# Patient Record
Sex: MALE | Race: WHITE | Employment: OTHER | URBAN - METROPOLITAN AREA
[De-identification: names, ages, dates, MRNs, and addresses within clinical notes are randomized per-mention and may not be internally consistent; named-entity substitution may affect disease eponyms.]

---

## 2018-08-23 ENCOUNTER — HOSPITAL ENCOUNTER (INPATIENT)
Age: 33
LOS: 4 days | Discharge: HOME | DRG: 897 | End: 2018-08-27
Attending: EMERGENCY MEDICINE | Admitting: PSYCHIATRY & NEUROLOGY
Payer: SELF-PAY

## 2018-08-23 DIAGNOSIS — F19.11 H/O: SUBSTANCE ABUSE (HCC): ICD-10-CM

## 2018-08-23 DIAGNOSIS — E87.6 HYPOKALEMIA: ICD-10-CM

## 2018-08-23 DIAGNOSIS — R45.851 SUICIDAL IDEATION: Primary | ICD-10-CM

## 2018-08-23 DIAGNOSIS — Z59.00 HOMELESSNESS: ICD-10-CM

## 2018-08-23 PROBLEM — F31.9 BIPOLAR DISORDER (HCC): Status: ACTIVE | Noted: 2018-08-23

## 2018-08-23 LAB
ALBUMIN SERPL-MCNC: 3.4 G/DL (ref 3.5–5)
ALBUMIN/GLOB SERPL: 1.1 {RATIO} (ref 1.1–2.2)
ALP SERPL-CCNC: 46 U/L (ref 45–117)
ALT SERPL-CCNC: 26 U/L (ref 12–78)
AMPHET UR QL SCN: NEGATIVE
ANION GAP SERPL CALC-SCNC: 8 MMOL/L (ref 5–15)
APPEARANCE UR: CLEAR
AST SERPL-CCNC: 20 U/L (ref 15–37)
BARBITURATES UR QL SCN: NEGATIVE
BASOPHILS # BLD: 0 K/UL (ref 0–0.1)
BASOPHILS NFR BLD: 1 % (ref 0–1)
BENZODIAZ UR QL: NEGATIVE
BILIRUB SERPL-MCNC: 0.2 MG/DL (ref 0.2–1)
BILIRUB UR QL: NEGATIVE
BUN SERPL-MCNC: 10 MG/DL (ref 6–20)
BUN/CREAT SERPL: 11 (ref 12–20)
CALCIUM SERPL-MCNC: 8.5 MG/DL (ref 8.5–10.1)
CANNABINOIDS UR QL SCN: NEGATIVE
CHLORIDE SERPL-SCNC: 107 MMOL/L (ref 97–108)
CO2 SERPL-SCNC: 30 MMOL/L (ref 21–32)
COCAINE UR QL SCN: NEGATIVE
COLOR UR: NORMAL
CREAT SERPL-MCNC: 0.92 MG/DL (ref 0.7–1.3)
DIFFERENTIAL METHOD BLD: ABNORMAL
DRUG SCRN COMMENT,DRGCM: NORMAL
EOSINOPHIL # BLD: 0.2 K/UL (ref 0–0.4)
EOSINOPHIL NFR BLD: 3 % (ref 0–7)
ERYTHROCYTE [DISTWIDTH] IN BLOOD BY AUTOMATED COUNT: 12.6 % (ref 11.5–14.5)
ETHANOL SERPL-MCNC: <10 MG/DL
GLOBULIN SER CALC-MCNC: 3.1 G/DL (ref 2–4)
GLUCOSE SERPL-MCNC: 97 MG/DL (ref 65–100)
GLUCOSE UR STRIP.AUTO-MCNC: NEGATIVE MG/DL
HCT VFR BLD AUTO: 35.3 % (ref 36.6–50.3)
HGB BLD-MCNC: 12.3 G/DL (ref 12.1–17)
HGB UR QL STRIP: NEGATIVE
IMM GRANULOCYTES # BLD: 0 K/UL (ref 0–0.04)
IMM GRANULOCYTES NFR BLD AUTO: 0 % (ref 0–0.5)
KETONES UR QL STRIP.AUTO: NEGATIVE MG/DL
LEUKOCYTE ESTERASE UR QL STRIP.AUTO: NEGATIVE
LYMPHOCYTES # BLD: 1.8 K/UL (ref 0.8–3.5)
LYMPHOCYTES NFR BLD: 29 % (ref 12–49)
MCH RBC QN AUTO: 32 PG (ref 26–34)
MCHC RBC AUTO-ENTMCNC: 34.8 G/DL (ref 30–36.5)
MCV RBC AUTO: 91.9 FL (ref 80–99)
METHADONE UR QL: NEGATIVE
MONOCYTES # BLD: 0.7 K/UL (ref 0–1)
MONOCYTES NFR BLD: 11 % (ref 5–13)
NEUTS SEG # BLD: 3.6 K/UL (ref 1.8–8)
NEUTS SEG NFR BLD: 57 % (ref 32–75)
NITRITE UR QL STRIP.AUTO: NEGATIVE
NRBC # BLD: 0 K/UL (ref 0–0.01)
NRBC BLD-RTO: 0 PER 100 WBC
OPIATES UR QL: NEGATIVE
PCP UR QL: NEGATIVE
PH UR STRIP: 6 [PH] (ref 5–8)
PLATELET # BLD AUTO: 127 K/UL (ref 150–400)
PMV BLD AUTO: 9.8 FL (ref 8.9–12.9)
POTASSIUM SERPL-SCNC: 3.1 MMOL/L (ref 3.5–5.1)
PROT SERPL-MCNC: 6.5 G/DL (ref 6.4–8.2)
PROT UR STRIP-MCNC: NEGATIVE MG/DL
RBC # BLD AUTO: 3.84 M/UL (ref 4.1–5.7)
SODIUM SERPL-SCNC: 145 MMOL/L (ref 136–145)
SP GR UR REFRACTOMETRY: 1.01 (ref 1–1.03)
UROBILINOGEN UR QL STRIP.AUTO: 0.2 EU/DL (ref 0.2–1)
WBC # BLD AUTO: 6.3 K/UL (ref 4.1–11.1)

## 2018-08-23 PROCEDURE — 80053 COMPREHEN METABOLIC PANEL: CPT | Performed by: EMERGENCY MEDICINE

## 2018-08-23 PROCEDURE — 90791 PSYCH DIAGNOSTIC EVALUATION: CPT

## 2018-08-23 PROCEDURE — 74011250637 HC RX REV CODE- 250/637: Performed by: EMERGENCY MEDICINE

## 2018-08-23 PROCEDURE — 36415 COLL VENOUS BLD VENIPUNCTURE: CPT | Performed by: EMERGENCY MEDICINE

## 2018-08-23 PROCEDURE — 65220000003 HC RM SEMIPRIVATE PSYCH

## 2018-08-23 PROCEDURE — 80307 DRUG TEST PRSMV CHEM ANLYZR: CPT | Performed by: EMERGENCY MEDICINE

## 2018-08-23 PROCEDURE — 85025 COMPLETE CBC W/AUTO DIFF WBC: CPT | Performed by: EMERGENCY MEDICINE

## 2018-08-23 PROCEDURE — 93005 ELECTROCARDIOGRAM TRACING: CPT

## 2018-08-23 PROCEDURE — 81003 URINALYSIS AUTO W/O SCOPE: CPT | Performed by: EMERGENCY MEDICINE

## 2018-08-23 PROCEDURE — 99284 EMERGENCY DEPT VISIT MOD MDM: CPT

## 2018-08-23 RX ORDER — HYDROXYZINE PAMOATE 25 MG/1
50 CAPSULE ORAL
Status: COMPLETED | OUTPATIENT
Start: 2018-08-23 | End: 2018-08-23

## 2018-08-23 RX ORDER — ADHESIVE BANDAGE
30 BANDAGE TOPICAL DAILY PRN
Status: DISCONTINUED | OUTPATIENT
Start: 2018-08-23 | End: 2018-08-27 | Stop reason: HOSPADM

## 2018-08-23 RX ORDER — ZOLPIDEM TARTRATE 10 MG/1
10 TABLET ORAL
Status: DISCONTINUED | OUTPATIENT
Start: 2018-08-23 | End: 2018-08-27 | Stop reason: HOSPADM

## 2018-08-23 RX ORDER — BENZTROPINE MESYLATE 1 MG/ML
2 INJECTION INTRAMUSCULAR; INTRAVENOUS
Status: DISCONTINUED | OUTPATIENT
Start: 2018-08-23 | End: 2018-08-27 | Stop reason: HOSPADM

## 2018-08-23 RX ORDER — OLANZAPINE 5 MG/1
5 TABLET ORAL
Status: DISCONTINUED | OUTPATIENT
Start: 2018-08-23 | End: 2018-08-27 | Stop reason: HOSPADM

## 2018-08-23 RX ORDER — IBUPROFEN 200 MG
1 TABLET ORAL
Status: DISCONTINUED | OUTPATIENT
Start: 2018-08-23 | End: 2018-08-27 | Stop reason: HOSPADM

## 2018-08-23 RX ORDER — LORAZEPAM 2 MG/ML
2 INJECTION INTRAMUSCULAR
Status: DISCONTINUED | OUTPATIENT
Start: 2018-08-23 | End: 2018-08-27 | Stop reason: HOSPADM

## 2018-08-23 RX ORDER — BENZTROPINE MESYLATE 2 MG/1
2 TABLET ORAL
Status: DISCONTINUED | OUTPATIENT
Start: 2018-08-23 | End: 2018-08-27 | Stop reason: HOSPADM

## 2018-08-23 RX ORDER — IBUPROFEN 400 MG/1
400 TABLET ORAL
Status: DISCONTINUED | OUTPATIENT
Start: 2018-08-23 | End: 2018-08-27 | Stop reason: HOSPADM

## 2018-08-23 RX ORDER — POTASSIUM CHLORIDE 1.5 G/1.77G
40 POWDER, FOR SOLUTION ORAL
Status: COMPLETED | OUTPATIENT
Start: 2018-08-23 | End: 2018-08-23

## 2018-08-23 RX ORDER — LORAZEPAM 1 MG/1
1 TABLET ORAL
Status: DISCONTINUED | OUTPATIENT
Start: 2018-08-23 | End: 2018-08-24

## 2018-08-23 RX ORDER — ACETAMINOPHEN 325 MG/1
650 TABLET ORAL
Status: DISCONTINUED | OUTPATIENT
Start: 2018-08-23 | End: 2018-08-27 | Stop reason: HOSPADM

## 2018-08-23 RX ADMIN — POTASSIUM CHLORIDE 40 MEQ: 1.5 POWDER, FOR SOLUTION ORAL at 15:12

## 2018-08-23 RX ADMIN — HYDROXYZINE PAMOATE 50 MG: 25 CAPSULE ORAL at 13:51

## 2018-08-23 SDOH — ECONOMIC STABILITY - HOUSING INSECURITY: HOMELESSNESS UNSPECIFIED: Z59.00

## 2018-08-23 NOTE — BSMART NOTE
Comprehensive Assessment Form Part 1    Section I - Disposition    Axis I - Bipolar Disorder (per patient)   Axis II - Deferred  Axis III - Seizures, VSD  Axis IV - Homelessness, Legal stressors, Lack of structure  [place] V - 35      The Medical Doctor to Psychiatrist conference was not completed. The Medical Doctor is in agreement with Psychiatrist disposition because of (reason) patient is requesting voluntary admission. The plan is admit to [facility]. The on-call Psychiatrist consulted was Dr. Juany Dooley. The admitting Psychiatrist will be Dr. Juany Dooley. The admitting Diagnosis is Bipolar Disorder. The Payor source is self. Section II - Integrated Summary  Summary:  Patient is a 29year old female seen face to face in the ER. He was sent to the ER by [facility] after he was seen by their rapid access today and requested the CSU, which is not currently open. He reported suicidal ideation and that he lived in [place], so he was not opened by [facility], he was sent to the ER for possible admission. He was released from senior care 2 days ago after spending 42 days incarcerated for trespassing. While he was in senior care his medications were resumed but also changed. He reports he is now on Remeron and Zyprexa. He has 5 previous inpatient hospitalizations at [facility], most recently in July 2018 prior to being incarcerated. He told the ER physician here that he wants \"a program to stay clean\" and that he also needs \"to get my meds straight. \"  He has an extensive history of abusing pretty much any substance he can get his hands on. He reported suicidal ideation with a plan to hang or cut himself. He denied homicidal ideation. He reported chronic auditory hallucinations. He is requesting voluntary admission. The patient has demonstrated mental capacity to provide informed consent. The information is given by the patient, Alycia Grace, and mother. The Chief Complaint is mental health problem.   The Precipitant Factors are recent discharge from alf, homelessness, chronic substance abuse issues. Previous Hospitalizations: yes  The patient has not previously been in restraints. Current Psychiatrist and/or  is NA. Lethality Assessment:    The potential for suicide is noted by the following: active psychosis, previous history of attempts, and ideation . The potential for homicide is not noted. The patient has not been a perpetrator of sexual or physical abuse. There are not pending charges. The patient is felt to be at risk for self harm or harm to others. The attending nurse was advised the patient needs supervision. Section III - Psychosocial  The patient's overall mood and attitude is depressed. Feelings of helplessness and hopelessness are observed by patient's self report. Generalized anxiety is not observed. Panic is not observed. Phobias are not observed. Obsessive compulsive tendencies are not observed. Section IV - Mental Status Exam  The patient's appearance shows no evidence of impairment. The patient's behavior shows no evidence of impairment. The patient is oriented to time, place, person and situation. The patient's speech shows no evidence of impairment. The patient's mood is depressed. The range of affect is constricted. The patient's thought content demonstrates no evidence of impairment. The thought process shows no evidence of impairment. The patient's perception demonstrated changes in the following:  auditory hallucinations. The patient's memory shows no evidence of impairment. The patient's appetite shows no evidence of impairment. The patient's sleep has evidence of insomnia. The patient's insight is blaming. The patient's judgement is psychologically impaired. Section V - Substance Abuse  The patient is not using substances right now, but patient has a history of using most any substance available in every way available.   He reported his drug of choice is heroin. The patient has experienced the following withdrawal symptoms: body aches and cravings. Section VI - Living Arrangements  The patient is single. The patient is homeless. The patient has one child age unknown. The patient does not plan to return home upon discharge. The patient does not have legal issues pending. The patient's source of income comes from family. Church and cultural practices have not been voiced at this time. The patient's greatest support comes from his mother and this person will not be involved with the treatment. The patient has not been in an event described as horrible or outside the realm of ordinary life experience either currently or in the past.  The patient has not been a victim of sexual/physical abuse. Section VII - Other Areas of Clinical Concern  The highest grade achieved is unknown with the overall quality of school experience being described as unknown. The patient is currently unemployed and speaks [place] as a primary language. The patient has no communication impairments affecting communication. The patient's preference for learning can be described as: can read and write adequately.   The patient's hearing is normal.  The patient's vision is normal.      [facility]

## 2018-08-23 NOTE — ED NOTES
Pt reports wanting to get into a program to \"stay clean\" and \"get meds straight\"; pt reports depression and some suicidal ideations; denies feeling suicidal currently; pt reports doing cocaine, opiates/heroin, amphetamines, and benzos until locked up about 43 days ago, reports being discharged 2 days ago; denies plan for harming self      Emergency Department Nursing Plan of Care       The Nursing Plan of Care is developed from the Nursing assessment and Emergency Department Attending provider initial evaluation. The plan of care may be reviewed in the ED Provider note.     The Plan of Care was developed with the following considerations:   Patient / Family readiness to learn indicated by:verbalized understanding  Persons(s) to be included in education: patient  Barriers to Learning/Limitations:No    Signed     Melissa Austin RN    8/23/2018   1:04 PM

## 2018-08-23 NOTE — ED NOTES
[address],Third Floor ability to accept patient report. Patient awake and ambulated to rest room.   Remains calm and cooperative

## 2018-08-23 NOTE — ED PROVIDER NOTES
EMERGENCY DEPARTMENT HISTORY AND PHYSICAL EXAM      Date: 8/23/2018  Patient Name: Liza Colón    History of Presenting Illness     Chief Complaint   Patient presents with   [address] Problem       History Provided By: Patient    HPI: Liza Colón, 29 y.o. male with PMHx significant for bipolar disorder and schizophrenia, presents ambulatory to the ED for mental health evaluation, including acute on chronic auditory hallucinations and SI without plan. Pt states he has been admitted for psychiatric reasons in the past, noting he was discharged from [facility] on 7/8. He explains he was arrested on 7/10 for trespassing and in [facility] until 8/21. Pt reports polysubstance abuse, including methamphetamines, cocaine, marijuana, and opiates, however explains the 42 days that he was in [facility] has been the longest he has remained off drugs since he started using. He reports since being released he has smoked one bowl of marijuana, however has not used any other illicit drugs or EtOH. Pt states he was previously managed by [facility] for substance abuse, which he did well with, however he went there this morning for intake and learned that they no longer have a substance abuse program. He conveys that [facility] referred him to the ED and he is requesting an inpatient program. He states at times he feels as though he is a threat to society as he is reckless when he is angry, but denies HI. Pt notes he is currently homeless. Chief complaint: mental health evaluation, request for inpatient rehab  Associated sx's: auditory hallucinations and SI without plan. Pt denies HI. Timing: acute on chronic  Context: prior psychiatric admissions, recent arrest, and illicit drug use  Duration: released from [facility] 8/21, sx's worsened today    There are no other complaints, changes, or physical findings at this time.     PCP: None    Current Outpatient Prescriptions   Medication Sig Dispense Refill    hydroxyzine pamoate (VISTARIL PO) Take  by mouth.  olanzapine (ZYPREXA PO) Take  by mouth.  mirtazapine (REMERON PO) Take  by mouth. Past History     Past Medical History:  Past Medical History:   Diagnosis Date    Heart abnormality     Psychiatric disorder     schizophrenia, bipolar    Seizures ([address]     VSD (ventricular septal defect)        Past Surgical History:  Past Surgical History:   Procedure Laterality Date    HX OTHER SURGICAL      PICC line placement       Family History:  History reviewed. No pertinent family history. Social History:  Social History   Substance Use Topics    Smoking status: Current Every Day Smoker    Smokeless tobacco: None    Alcohol use 14.0 oz/week     18 Cans of beer, 10 Shots of liquor per week      Comment: every day       Allergies:  No Known Allergies      Review of Systems   Review of Systems   Constitutional: Negative for chills and fever. HENT: Negative for congestion, rhinorrhea, sneezing and sore throat. Eyes: Negative for redness and visual disturbance. Respiratory: Negative for shortness of breath. Cardiovascular: Negative for chest pain and leg swelling. Gastrointestinal: Negative for abdominal pain, nausea and vomiting. Genitourinary: Negative for difficulty urinating and frequency. Musculoskeletal: Negative for back pain, myalgias and neck stiffness. Skin: Negative for rash. Neurological: Negative for dizziness, syncope, weakness and headaches. Hematological: Negative for adenopathy. Psychiatric/Behavioral: Positive for hallucinations and suicidal ideas. - homicidal plans   All other systems reviewed and are negative. Physical Exam   Physical Exam   Constitutional: He is oriented to person, place, and time. He appears well-developed and well-nourished. HENT:   Head: Normocephalic and atraumatic.    Mouth/Throat: Oropharynx is clear and moist and mucous membranes are normal.   Eyes: EOM are normal.   Neck: Normal range of motion and full passive range of motion without pain. Neck supple. Cardiovascular: Normal rate, regular rhythm, normal heart sounds, intact distal pulses and normal pulses. No murmur heard. Pulmonary/Chest: Effort normal and breath sounds normal. No respiratory distress. He exhibits no tenderness. Abdominal: Soft. Normal appearance and bowel sounds are normal. There is no tenderness. There is no rebound and no guarding. Neurological: He is alert and oriented to person, place, and time. He has normal strength. Skin: Skin is warm, dry and intact. No rash noted. No erythema. Psychiatric: His speech is normal. Judgment normal. He expresses suicidal ideation. He expresses no suicidal plans. Nursing note and vitals reviewed. Diagnostic Study Results     Labs -     Recent Results (from the past 12 hour(s))   EKG, 12 LEAD, INITIAL    Collection Time: 08/23/18  1:31 PM   Result Value Ref Range    Ventricular Rate 67 BPM    Atrial Rate 67 BPM    P-R Interval 120 ms    QRS Duration 118 ms    Q-T Interval 440 ms    QTC Calculation (Bezet) 464 ms    Calculated P Axis 57 degrees    Calculated R Axis 71 degrees    Calculated T Axis 54 degrees    Diagnosis       Normal sinus rhythm  Incomplete right bundle branch block  Borderline ECG  No previous ECGs available     METABOLIC PANEL, COMPREHENSIVE    Collection Time: 08/23/18  1:40 PM   Result Value Ref Range    Sodium 145 136 - 145 mmol/L    Potassium 3.1 (L) 3.5 - 5.1 mmol/L    Chloride 107 97 - 108 mmol/L    CO2 30 21 - 32 mmol/L    Anion gap 8 5 - 15 mmol/L    Glucose 97 65 - 100 mg/dL    BUN 10 6 - 20 MG/DL    Creatinine 0.92 0.70 - 1.30 MG/DL    BUN/Creatinine ratio 11 (L) 12 - 20      GFR est AA >60 >60 ml/min/1.73m2    GFR est non-AA >60 >60 ml/min/1.73m2    Calcium 8.5 8.5 - 10.1 MG/DL    Bilirubin, total 0.2 0.2 - 1.0 MG/DL    ALT (SGPT) 26 12 - 78 U/L    AST (SGOT) 20 15 - 37 U/L    Alk.  phosphatase 46 45 - 117 U/L    Protein, total 6.5 6.4 - 8.2 g/dL    Albumin 3.4 (L) 3.5 - 5.0 g/dL    Globulin 3.1 2.0 - 4.0 g/dL    A-G Ratio 1.1 1.1 - 2.2     ETHYL ALCOHOL    Collection Time: 08/23/18  1:40 PM   Result Value Ref Range    ALCOHOL(ETHYL),SERUM <10 <10 MG/DL   CBC WITH AUTOMATED DIFF    Collection Time: 08/23/18  1:40 PM   Result Value Ref Range    WBC 6.3 4.1 - 11.1 K/uL    RBC 3.84 (L) 4.10 - 5.70 M/uL    HGB 12.3 12.1 - 17.0 g/dL    HCT 35.3 (L) 36.6 - 50.3 %    MCV 91.9 80.0 - 99.0 FL    MCH 32.0 26.0 - 34.0 PG    MCHC 34.8 30.0 - 36.5 g/dL    RDW 12.6 11.5 - 14.5 %    PLATELET 813 (L) 286 - 400 K/uL    MPV 9.8 8.9 - 12.9 FL    NRBC 0.0 0  WBC    ABSOLUTE NRBC 0.00 0.00 - 0.01 K/uL    NEUTROPHILS 57 32 - 75 %    LYMPHOCYTES 29 12 - 49 %    MONOCYTES 11 5 - 13 %    EOSINOPHILS 3 0 - 7 %    BASOPHILS 1 0 - 1 %    IMMATURE GRANULOCYTES 0 0.0 - 0.5 %    ABS. NEUTROPHILS 3.6 1.8 - 8.0 K/UL    ABS. LYMPHOCYTES 1.8 0.8 - 3.5 K/UL    ABS. MONOCYTES 0.7 0.0 - 1.0 K/UL    ABS. EOSINOPHILS 0.2 0.0 - 0.4 K/UL    ABS. BASOPHILS 0.0 0.0 - 0.1 K/UL    ABS. IMM. GRANS. 0.0 0.00 - 0.04 K/UL    DF AUTOMATED     DRUG SCREEN, URINE    Collection Time: 08/23/18  1:40 PM   Result Value Ref Range    AMPHETAMINES NEGATIVE  NEG      BARBITURATES NEGATIVE  NEG      BENZODIAZEPINES NEGATIVE  NEG      COCAINE NEGATIVE  NEG      METHADONE NEGATIVE  NEG      OPIATES NEGATIVE  NEG      PCP(PHENCYCLIDINE) NEGATIVE  NEG      THC (TH-CANNABINOL) NEGATIVE  NEG      Drug screen comment (NOTE)      Medical Decision Making   I am the first provider for this patient. I reviewed the vital signs, available nursing notes, past medical history, past surgical history, family history and social history. Vital Signs-Reviewed the patient's vital signs.   Patient Vitals for the past 12 hrs:   Temp Pulse Resp BP SpO2   08/23/18 1254 97.4 °F (36.3 °C) 85 16 108/75 100 %       Pulse Oximetry Analysis - 100% on RA    Records Reviewed: Nursing Notes and Old Medical Records    Provider Notes (Medical Decision Making):   DDx: substance abuse, auditory hallucinations, schizophrenia, suicidal ideation. Pt reports he did not initially have SI, however since going to [facility] and getting frustrated he conveys vague suicidal ideas. ED Course:   Initial assessment performed. The patients presenting problems have been discussed, and they are in agreement with the care plan formulated and outlined with them. I have encouraged them to ask questions as they arise throughout their visit. ED EKG interpretation: 13:31  Rhythm: normal sinus rhythm and incomplete RBBB; and regular . Rate (approx.): 67; Axis: normal; P wave: normal; QRS interval: normal ; ST/T wave: normal; Other findings: borderline ekg. This EKG was interpreted by Natali Lowery MD,ED Provider. CONSULT NOTE:   1:18 PM  Natali Lowery MD spoke with Cedric Valero   Specialty: Jasmina Churchill  Discussed pt's hx, disposition, and available diagnostic and imaging results. Reviewed care plans. Consultant agrees with plans as outlined. Cedric Valero [place] a [place] at [address], however will then come to [facility] to evaluate pt. Written by AISSATOU Celeste, as dictated by Natali Lowery MD.    PROGRESS NOTE:  2:20 PM  Pt is medically cleared. Has been given a dose of potassium for his hypokalemia. Written by AISSATOU Celeste, as dictated by Natali Lowery MD.    PROGRESS NOTE:  4:11 PM  Cedric Valero is in the department. She will discuss pt with his mother first and then evaluate him. Written by AISSATOU Celeste, as dictated by Natali Lowery MD.    PROGRESS NOTE:  4:59 PM  Cedric Valero has evaluated pt. She states he will be admitted to psychiatry by Dr. Curtis Salomon. Psychiatry requesting UA.   Written by AISSATOU Celeste, as dictated by Natali Lowery MD.    Critical Care Time: 0    Disposition:  ADMIT NOTE:  5:03 PM  The patient is being admitted to the hospital by Dominic Rendon MD.  The results of their tests and reasons for their admission have been discussed with the patient and/or available family. They convey agreement and understanding for the need to be admitted and for their admission diagnosis. PLAN:  1. Admit to psychiatry     Diagnosis     Clinical Impression:   1. Suicidal ideation    2. H/O: substance abuse    3. Homelessness    4. Hypokalemia        Attestations: This note is prepared by Shea Apgar, acting as Scribe for Marcell Claude, MD.    Marcell Claude, MD: The scribe's documentation has been prepared under my direction and personally reviewed by me in its entirety. I confirm that the note above accurately reflects all work, treatment, procedures, and medical decision making performed by me. This note will not be viewable in [address]

## 2018-08-23 NOTE — ED NOTES
Pt reports being seen at [facility] today for intake, given follow up information

## 2018-08-23 NOTE — ED NOTES
Patient transported to [facility] Unit via wheel chair.   Accompanied by Security

## 2018-08-23 NOTE — IP AVS SNAPSHOT
Summary of Care Report The Summary of Care report has been created to help improve care coordination. Users with access to [employer] or [facility] (Web-based application) may access additional patient information including the Discharge Summary. If you are not currently a [address] user and need more information, please call the number listed below in the [place] [address] section and ask to be connected with Medical Records. Facility Information Name Address Phone [facility] [address] [place] [address] [phone] Patient Information Patient Name Sex KAUSHAL Gaitan ([id number]) Male 1985 Discharge Information Admitting Provider Service Area Unit Jaylen Tinsley MD / [address] [place] [address] / [phone] Discharge Provider Discharge Date/Time Discharge Disposition Destination (none) 2018 Midday (Pending) AHR (none) Patient Language Language ENGLISH [13] Hospital Problems as of 2018  Never Reviewed Class Noted - Resolved Last Modified POA Active Problems Adjustment disorder with depressed mood  2018 - Present 2018 by Jesús Fu MD Unknown Entered by Jesús Fu MD  
  * (Principal)Major depressive disorder, single episode, unspecified  2018 - Present 2018 by Jesús Fu MD Unknown Entered by Jesús Fu MD  
  Methamphetamine use disorder, moderate ([facility] [address]  2018 - Present 2018 by Jesús Fu MD Unknown Entered by Jesús Fu MD  
  
You are allergic to the following No active allergies Current Discharge Medication List  
  
CONTINUE these medications which have CHANGED Dose & Instructions Dispensing Information Comments  * REMERON PO  
 What changed:  Another medication with the same name was added. Make sure you understand how and when to take each. Take  by mouth. Refills:  0  
   
 * mirtazapine 7.5 mg tablet Commonly known as:  Danny Section What changed: You were already taking a medication with the same name, and this prescription was added. Make sure you understand how and when to take each. Dose:  22.5 mg Take 3 Tabs by mouth nightly. Indications: major depressive disorder Quantity:  90 Tab Refills:  0  
   
 * VISTARIL PO What changed:  Another medication with the same name was added. Make sure you understand how and when to take each. Take  by mouth. Refills:  0  
   
 * hydrOXYzine pamoate 50 mg capsule Commonly known as:  VISTARIL What changed: You were already taking a medication with the same name, and this prescription was added. Make sure you understand how and when to take each. Dose:  50 mg Take 1 Cap by mouth three (3) times daily as needed for Anxiety for up to 14 days. Indications: anxiety Quantity:  90 Cap Refills:  0  
   
 * Notice: This list has 4 medication(s) that are the same as other medications prescribed for you. Read the directions carefully, and ask your doctor or other care provider to review them with you. STOP taking these medications Comments ZYPREXA PO Follow-up Information Follow up With Details Comments Contact Info [address] Call today [facility] Int[facility] appointment for services. Due to the limited slots, you may want to arrive by 7:00am. The doors open at 7:30am and you should sign in at the registration desk. [address] [address] Hours: Monday-Friday 8:30am- 12:00PM & 1:00PM-4:30pm  
[phone] (phone) [phone]  (fax)  [facility] and susbtance abuse medication  management, therapy and case management serivces. [address] [address] [phone] [facility] Go today This is the inpatient rehabilitation facility for substance abuse you will be going to today. Stefania Albrecht [address] [place] [address] 
[phone] [address]    [id number]  [address] 
[phone] Housing Crisis Intake [facility] [phone] Discharge Instructions DISCHARGE SUMMARY from Nurse PATIENT INSTRUCTIONS: 
What to do at Home: 
Recommended activity: Activity as tolerated *  Please give a list of your current medications to your Primary Care Provider. *  Please update this list whenever your medications are discontinued, doses are 
    changed, or new medications (including over-the-counter products) are added. *  Please carry medication information at all times in case of emergency situations. These are general instructions for a healthy lifestyle: No smoking/ No tobacco products/ Avoid exposure to second hand smoke Surgeon General's Warning:  Quitting smoking now greatly reduces serious risk to your health. Obesity, smoking, and sedentary lifestyle greatly increases your risk for illness A healthy diet, regular physical exercise & weight monitoring are important for maintaining a healthy lifestyle The discharge information has been reviewed with the patient. The patient verbalized understanding. Discharge medications reviewed with the patient and appropriate educational materials and side effects teaching were provided. ___________________________________________________________________________________________________________________________________ [place] If I feel I am at risk of hurting myself or others, I will call the crisis office and speak with a crisis worker who will assist me during my crisis. Connie [facility] [employer]  [phone] [address] [address] [phone] [place] [address]  [phone] [employer] [facility]-   [phone] [employer]-  [phone] [address] [phone] [facility]  [phone] Chart Review Routing History No Routing History on File

## 2018-08-23 NOTE — IP AVS SNAPSHOT
[address] [facility] 
 
 
 [address] White Patient: Pete Tan MRN: [id number] About your hospitalization You were admitted on:  August 23, 2018 You last received care in the:  [facility] [address] You were discharged on:  August 27, 2018 Why you were hospitalized Your primary diagnosis was: Major Depressive Disorder, Single Episode, Unspecified Your diagnoses also included:  Bipolar Disorder (Hcc), Adjustment Disorder With Depressed Mood, Methamphetamine Use Disorder, Moderate (Hcc) Follow-up Information Follow up With Details Comments Contact Info Silverio[address] Kermit Cuellar Call today For outpatient susbstance services Intake appointment for services. Due to the limited slots, you may want to arrive by 7:00am. The doors open at 7:30am and you should sign in at the registration desk. [id number] Hours: Monday-Friday 8:30am- 12:00PM & 1:00PM-4:30pm  
[phone] (phone) [phone]  (fax) Pham Ching [facility] area [facility] for mental health and susbtance abuse medication  management, therapy and case management serivces. [address] [address] [phone] [facility] Go today This is the inpatient rehabilitation facility for substance abuse you will be going to today. Stefania Albrecht [address] [place], [facility] [address] [place] [address] 
[phone] Nieves Ludwig 1    [address]  H .1 ANALY/Ruddy Newell Final 
[phone] Housing Crisis Intake [employer] [phone] Discharge Orders None A check ofelia indicates which time of day the medication should be taken. My Medications CHANGE how you take these medications Instructions Each Dose to Equal  
 Morning Noon Evening Bedtime * REMERON PO What changed:  Another medication with the same name was added.  Make sure you understand how and when to take each. Your last dose was: Your next dose is: Take  by mouth. * mirtazapine 7.5 mg tablet Commonly known as:  Elsie Garcia What changed: You were already taking a medication with the same name, and this prescription was added. Make sure you understand how and when to take each. Your last dose was: Your next dose is: Take 3 Tabs by mouth nightly. Indications: major depressive disorder 22.5 mg  
    
   
   
   
  
 * VISTARIL PO What changed:  Another medication with the same name was added. Make sure you understand how and when to take each. Your last dose was: Your next dose is: Take  by mouth. * hydrOXYzine pamoate 50 mg capsule Commonly known as:  VISTARIL What changed: You were already taking a medication with the same name, and this prescription was added. Make sure you understand how and when to take each. Your last dose was: Your next dose is: Take 1 Cap by mouth three (3) times daily as needed for Anxiety for up to 14 days. Indications: anxiety 50 mg  
    
   
   
   
  
 * Notice: This list has 4 medication(s) that are the same as other medications prescribed for you. Read the directions carefully, and ask your doctor or other care provider to review them with you. STOP taking these medications ZYPREXA PO Where to Get Your Medications Information on where to get these meds will be given to you by the nurse or doctor. ! Ask your nurse or doctor about these medications  
  hydrOXYzine pamoate 50 mg capsule  
 mirtazapine 7.5 mg tablet Discharge Instructions DISCHARGE SUMMARY from Nurse PATIENT INSTRUCTIONS: 
What to do at Home: 
Recommended activity: Activity as tolerated *  Please give a list of your current medications to your Primary Care Provider. *  Please update this list whenever your medications are discontinued, doses are 
    changed, or new medications (including over-the-counter products) are added. *  Please carry medication information at all times in case of emergency situations. These are general instructions for a healthy lifestyle: No smoking/ No tobacco products/ Avoid exposure to second hand smoke Surgeon General's Warning:  Quitting smoking now greatly reduces serious risk to your health. Obesity, smoking, and sedentary lifestyle greatly increases your risk for illness A healthy diet, regular physical exercise & weight monitoring are important for maintaining a healthy lifestyle The discharge information has been reviewed with the patient. The patient verbalized understanding. Discharge medications reviewed with the patient and appropriate educational materials and side effects teaching were provided. ___________________________________________________________________________________________________________________________________ Jacquelin Escobar If I feel I am at risk of hurting myself or others, I will call the crisis office and speak with a crisis worker who will assist me during my crisis. Jacquelin Escobar [facility]  [phone] [address] [place] [address] [phone] [place] [address]  [phone] [employer] 44-   [phone] [employer]-  [phone] 9 [facility]  [phone] [address] [place] [address]  [phone] Introducing \[facility]\"" & HEALTH SERVICES! [address] introduces [employer] patient portal. Now you can access parts of your medical record, email your doctor's office, and request medication refills online. 1. In your internet browser, go to [email or web address] 2. Click on the First Time User? Click Here link in the Sign In box. You will see the New Member Sign Up page. 3. Enter your [employer] Access Code exactly as it appears below.  You will not need to use this code after youve completed the sign-up process. If you do not sign up before the expiration date, you must request a new code. · [employer] Access Code: [email or web address] Expires: 11/21/2018 12:51 PM 
 
4. Enter the last four digits of your Social Security Number (xxxx) and Date of Birth (mm/dd/yyyy) as indicated and click Submit. You will be taken to the next sign-up page. 5. Create a [employer] ID. This will be your [employer] login ID and cannot be changed, so think of one that is secure and easy to remember. 6. Create a [employer] password. You can change your password at any time. 7. Enter your Password Reset Question and Answer. This can be used at a later time if you forget your password. 8. Enter your e-mail address. You will receive e-mail notification when new information is available in [address] 9. Click Sign Up. You can now view and download portions of your medical record. 10. Click the Download Summary menu link to download a portable copy of your medical information. If you have questions, please visit the Frequently Asked Questions section of the [employer] website. Remember, [employer] is NOT to be used for urgent needs. For medical emergencies, dial 911. Now available from your iPhone and Android! Introducing Lenin Tran As a [facility] patient, I wanted to make you aware of our electronic visit tool called [employer]. [facility] 24/7 allows you to connect within minutes with a medical provider 24 hours a day, seven days a week via a mobile device or tablet or logging into a secure website from your computer. You can access Lenin Tran from anywhere in the [place].  
 
A virtual visit might be right for you when you have a simple condition and feel like you just dont want to get out of bed, or cant get away from work for an appointment, when your regular [facility] provider is not available (evenings, weekends or holidays), or when youre out of town and need minor care. Electronic visits cost only $49 and if the [employer] 24/7 provider determines a prescription is needed to treat your condition, one can be electronically transmitted to a nearby pharmacy*. Please take a moment to enroll today if you have not already done so. The enrollment process is free and takes just a few minutes. To enroll, please download the [employer] 24/7 jason to your tablet or phone, or visit [email or web address] to enroll on your computer. And, as an [address] [place] [address] patient with a [email or web address] account, the results of your visits will be scanned into your electronic medical record and your primary care provider will be able to view the scanned results. We urge you to continue to see your regular [employer] provider for your ongoing medical care. And while your primary care provider may not be the one available when you seek a [employer] virtual visit, the peace of mind you get from getting a real diagnosis real time can be priceless. For more information on [employer], view our Frequently Asked Questions (FAQs) at [email or web address]. Sincerely, 
 
Evy Strickland MD 
Chief Medical Officer [address] [place] [address] *:  certain medications cannot be prescribed via [facility] Providers Seen During Your Hospitalization Provider Specialty Primary office phone Gosia Page MD Emergency Medicine [phone] Gary Landers MD Psychiatry [phone] Jo Ann Comer MD Psychiatry [phone] Your Primary Care Physician (PCP) Primary Care Physician Office Phone Office Fax NONE ** None ** ** None ** You are allergic to the following No active allergies Recent Documentation Height Weight BMI Smoking Status 1.73 m 67.2 kg 22.46 kg/m2 Current Every Day Smoker Emergency Contacts Name Discharge Info Relation Home Work Mobile Chely Mclain DISCHARGE CAREGIVER [3] Parent [1] [phone] Patient Belongings The following personal items are in your possession at time of discharge: 
  Dental Appliances: (P) None  Visual Aid: None      Home Medications: (P) Sent to pharmacy   Jewelry: (P) None  Clothing: (P) Footwear, Shirt, Shorts    Other Valuables: (P) Keys, Lighter/matches, Money (comment)  Personal Items Sent to Safe: (P) money, lighters, keys Please provide this summary of care documentation to your next provider. Signatures-by signing, you are acknowledging that this After Visit Summary has been reviewed with you and you have received a copy. Patient Signature:  ____________________________________________________________ Date:  ____________________________________________________________  
  
Collette Roque Provider Signature:  ____________________________________________________________ Date:  ____________________________________________________________

## 2018-08-23 NOTE — IP AVS SNAPSHOT
[address] [facility] 
 
 
 [address] Patient: Delia Bella MRN: [id number] KFM:[id number] A check ofelia indicates which time of day the medication should be taken. My Medications CHANGE how you take these medications Instructions Each Dose to Equal  
 Morning Noon Evening Bedtime * REMERON PO What changed:  Another medication with the same name was added. Make sure you understand how and when to take each. Your last dose was: Your next dose is: Take  by mouth. * mirtazapine 7.5 mg tablet Commonly known as:  Nori Harding What changed: You were already taking a medication with the same name, and this prescription was added. Make sure you understand how and when to take each. Your last dose was: Your next dose is: Take 3 Tabs by mouth nightly. Indications: major depressive disorder 22.5 mg  
    
   
   
   
  
 * VISTARIL PO What changed:  Another medication with the same name was added. Make sure you understand how and when to take each. Your last dose was: Your next dose is: Take  by mouth. * hydrOXYzine pamoate 50 mg capsule Commonly known as:  VISTARIL What changed: You were already taking a medication with the same name, and this prescription was added. Make sure you understand how and when to take each. Your last dose was: Your next dose is: Take 1 Cap by mouth three (3) times daily as needed for Anxiety for up to 14 days. Indications: anxiety 50 mg  
    
   
   
   
  
 * Notice: This list has 4 medication(s) that are the same as other medications prescribed for you. Read the directions carefully, and ask your doctor or other care provider to review them with you. STOP taking these medications  ZYPREXA PO  
   
  
  
  
 Where to Get Your Medications Information on where to get these meds will be given to you by the nurse or doctor. ! Ask your nurse or doctor about these medications  
  hydrOXYzine pamoate 50 mg capsule  
 mirtazapine 7.5 mg tablet

## 2018-08-23 NOTE — ED TRIAGE NOTES
Pt reports he was released from [facility] 2 days ago after 42 days in lock up. Patient reports he has been clean for over 42 days of all kinds of drugs including, cocaine, opiates, marijuana. Pt reports he is bipolar and is having intermittent suicidal thoughts without a plan. Pt was seen at [facility] this morning and has his paperwork with him.

## 2018-08-24 PROBLEM — F11.11: Status: RESOLVED | Noted: 2018-08-24 | Resolved: 2018-08-24

## 2018-08-24 PROBLEM — F15.20 METHAMPHETAMINE USE DISORDER, MODERATE (HCC): Status: ACTIVE | Noted: 2018-08-24

## 2018-08-24 PROBLEM — F43.21 ADJUSTMENT DISORDER WITH DEPRESSED MOOD: Status: ACTIVE | Noted: 2018-08-24

## 2018-08-24 PROBLEM — F31.9 BIPOLAR DISORDER (HCC): Status: RESOLVED | Noted: 2018-08-23 | Resolved: 2018-08-24

## 2018-08-24 PROBLEM — F11.11: Status: ACTIVE | Noted: 2018-08-24

## 2018-08-24 PROBLEM — F32.9 MAJOR DEPRESSIVE DISORDER, SINGLE EPISODE, UNSPECIFIED: Status: ACTIVE | Noted: 2018-08-24

## 2018-08-24 LAB
ATRIAL RATE: 67 BPM
CALCULATED P AXIS, ECG09: 57 DEGREES
CALCULATED R AXIS, ECG10: 71 DEGREES
CALCULATED T AXIS, ECG11: 54 DEGREES
CHOLEST SERPL-MCNC: 93 MG/DL
DIAGNOSIS, 93000: NORMAL
GLUCOSE P FAST SERPL-MCNC: 97 MG/DL (ref 65–100)
HDLC SERPL-MCNC: 40 MG/DL
HDLC SERPL: 2.3 {RATIO} (ref 0–5)
LDLC SERPL CALC-MCNC: 23 MG/DL (ref 0–100)
LIPID PROFILE,FLP: ABNORMAL
P-R INTERVAL, ECG05: 120 MS
Q-T INTERVAL, ECG07: 440 MS
QRS DURATION, ECG06: 118 MS
QTC CALCULATION (BEZET), ECG08: 464 MS
TRIGL SERPL-MCNC: 150 MG/DL (ref ?–150)
TSH SERPL DL<=0.05 MIU/L-ACNC: 1.52 UIU/ML (ref 0.36–3.74)
VENTRICULAR RATE, ECG03: 67 BPM
VLDLC SERPL CALC-MCNC: 30 MG/DL

## 2018-08-24 PROCEDURE — 74011250637 HC RX REV CODE- 250/637: Performed by: PSYCHIATRY & NEUROLOGY

## 2018-08-24 PROCEDURE — 82947 ASSAY GLUCOSE BLOOD QUANT: CPT | Performed by: PSYCHIATRY & NEUROLOGY

## 2018-08-24 PROCEDURE — 84443 ASSAY THYROID STIM HORMONE: CPT | Performed by: PSYCHIATRY & NEUROLOGY

## 2018-08-24 PROCEDURE — 65220000003 HC RM SEMIPRIVATE PSYCH

## 2018-08-24 PROCEDURE — 36415 COLL VENOUS BLD VENIPUNCTURE: CPT | Performed by: PSYCHIATRY & NEUROLOGY

## 2018-08-24 PROCEDURE — 80061 LIPID PANEL: CPT | Performed by: PSYCHIATRY & NEUROLOGY

## 2018-08-24 RX ORDER — HYDROXYZINE PAMOATE 25 MG/1
50 CAPSULE ORAL
Status: DISCONTINUED | OUTPATIENT
Start: 2018-08-24 | End: 2018-08-27 | Stop reason: HOSPADM

## 2018-08-24 RX ORDER — HYDROXYZINE PAMOATE 25 MG/1
25 CAPSULE ORAL
Status: DISCONTINUED | OUTPATIENT
Start: 2018-08-24 | End: 2018-08-24

## 2018-08-24 RX ORDER — MIRTAZAPINE 15 MG/1
15 TABLET, FILM COATED ORAL
Status: DISCONTINUED | OUTPATIENT
Start: 2018-08-24 | End: 2018-08-26

## 2018-08-24 RX ADMIN — MIRTAZAPINE 15 MG: 15 TABLET, FILM COATED ORAL at 21:34

## 2018-08-24 RX ADMIN — HYDROXYZINE PAMOATE 50 MG: 25 CAPSULE ORAL at 16:08

## 2018-08-24 RX ADMIN — IBUPROFEN 400 MG: 400 TABLET ORAL at 16:08

## 2018-08-24 RX ADMIN — HYDROXYZINE PAMOATE 50 MG: 25 CAPSULE ORAL at 21:33

## 2018-08-24 RX ADMIN — HYDROXYZINE PAMOATE 25 MG: 25 CAPSULE ORAL at 12:56
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
## 2018-08-24 NOTE — PROGRESS NOTES
Problem: Suicide/Homicide (Adult/Pediatric)  Goal: *STG: Remains safe in hospital  Outcome: Progressing Towards Goal  Pt is visible on the unit. sociazling with peers. Attending groups. Meal complaint. Calm and cooperative. Will continue to monitor patient and assess needs.

## 2018-08-24 NOTE — BH NOTES
Patient came to nurses' station requesting \"home medications\" also requesting 50mg of vistaril. Informed this mediation was not ordered. Patient became loud and demanding. Requested to leave AMA. Dr. Serge Reed called to inform of this. Dr. Serge Reed  endorses he would order patient 50mg of Vistaril and is still verifying other home medications as reported by patient. Patient endorsed he wanted other medications and would leave AMA if they could not be ordered. Dr. Serge Reed  placed discharge per [facility] order. Twenty minutes later patient endorsed he would like to stay and follow current  treatment plan. Dr. Serge Reed  called and informed and endorses patient could stay. Will continue to monitor patient status and assess needs.

## 2018-08-24 NOTE — DISCHARGE INSTRUCTIONS
DISCHARGE SUMMARY from Nurse    PATIENT INSTRUCTIONS:  What to do at Home:  Recommended activity: Activity as tolerated  *  Please give a list of your current medications to your Primary Care Provider. *  Please update this list whenever your medications are discontinued, doses are      changed, or new medications (including over-the-counter products) are added. *  Please carry medication information at all times in case of emergency situations. These are general instructions for a healthy lifestyle:    No smoking/ No tobacco products/ Avoid exposure to second hand smoke  Surgeon General's Warning:  Quitting smoking now greatly reduces serious risk to your health. Obesity, smoking, and sedentary lifestyle greatly increases your risk for illness    A healthy diet, regular physical exercise & weight monitoring are important for maintaining a healthy lifestyle  The discharge information has been reviewed with the patient. The patient verbalized understanding. Discharge medications reviewed with the patient and appropriate educational materials and side effects teaching were provided. ___________________________________________________________________________________________________________________________________  . If I feel I am at risk of hurting myself or others, I will call the crisis office and speak with a crisis worker who will assist me during my crisis. Luann Gray Dk [address]  [phone]  [address] [place] [address] [phone] [address] [facility]  [address]-  [phone]

## 2018-08-24 NOTE — PROGRESS NOTES
Problem: Suicide/Homicide (Adult/Pediatric)  Goal: *STG: Remains safe in hospital  Outcome: Progressing Towards Goal  Pt rested quietly in bed with eyes closed. No signs/symptoms of distress, agitation, or anxiety. Will monitor for changes. Q 15 minute checks continue.  Pt slept 5 hrs

## 2018-08-24 NOTE — BH NOTES
Patient with complaints of anxiety. PRN Vistaril given. Will continue to monitor patient and assess needs per [facility] protocol.

## 2018-08-24 NOTE — BH NOTES
PSYCHOSOCIAL ASSESSMENT  :Patient identifying info:  Jessa Farias is a 29 y.o., male admitted 8/23/2018 12:57 PM     Presenting problem and precipitating factors: Pt was admitted voluntarily due to reporting suicidal ideation with plan to cut or hang self while attending [facility] intake appointment. [facility] - where he spent 42 days after trespassing charge - he was held due to 2 failure to appear in court for possession charges. Pt reports resuming medication while in penitentiary remeron, Zyprexa and visteral - now off medication - focused on receiving  stimulants. Pt's stressors include homelessness, lack of structure, unemployment, legal issues and condition worsened by continued illicit drug use and treatment noncompliance. Prior to penitentiary stint pt was admitted to [facility] for similar thoughts. Pt reports 5 prior psych hospitalizations. Pt vebalized interest for inpatient substance abuse rehabilitation - no prior programs - would like to remain sober and become employed. Pt's mother, Mayela Albarado whom pt considers his greatest support was called - mother is currently taking care of pt's 9year old daughter who stays with biological mother and pt's mother. Per mother's report pt abuses street and prescription drugs. Pt carries bipolar diagnosis. Pt's mother reports she is proud of him for making the decision to enter inpatient rehab. Mental status assessment:   Pt is alert and oriented. Pt denies SI/HI. Pt's mood is depressed, affect is mood congruent. Pt's thought process is logical.  Pt's insight and judgment is fair to poor, reliability is fair. Current psychiatric providers and contact info: None. Previous psychiatric services/providers and response to treatment: Hx of tx and medication noncompliance. No previous SA programs. Family history of mental illness : Unknown.      Substance abuse history:  Extensive history of substance abuse drug of choice being heroin. UDS negative; BAL=0. Social History   Substance Use Topics    Smoking status: Current Every Day Smoker    Smokeless tobacco: Not on file    Alcohol use 14.0 oz/week     18 Cans of beer, 10 Shots of liquor per week      Comment: every day       Family constellation: Pt is single and has a 9year old daughter - pt's mother takes care of child. Is significant other involved? N/A. Describe support system: Supportive mother Sherice Nicholas [phone] - DOUGLAS signed     Describe living arrangements and home environment:  Pt is homeless. Health issues:   Hospital Problems  Never Reviewed          Codes Class Noted POA    Heroin use disorder, mild, in early remission, on maintenance therapy, abuse ICD-10-CM: F11.11  ICD-9-CM: 305.50  2018 Unknown        Adjustment disorder with depressed mood ICD-10-CM: F43.21  ICD-9-CM: 309.0  2018 Unknown        * (Principal)Major depressive disorder, single episode, unspecified ICD-10-CM: F32.9  ICD-9-CM: 296.20  2018 Unknown              Trauma history: Unknown     Legal issues: Released from intermediate 2 days ago. Previous possesion and trespassing charges. History of  service: None     Financial status: None. 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 \"I don't think I'll ever quit\"   Describe coping skills: Very poor.      Marisol Scott  2018

## 2018-08-24 NOTE — BH NOTES
GROUP THERAPY PROGRESS NOTE    The patient Randy Thakur a 29 y.o. male is participating in [employer] Group. Group time: 1 hour    Personal goal for participation: To concentrate on selected task    Goal orientation: social    Group therapy participation: active    Therapeutic interventions reviewed and discussed: Crafts, games, music    Impression of participation: The patient was attentive.     Glenn Snow  8/24/2018  5:16 PM
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
## 2018-08-25 LAB — POTASSIUM SERPL-SCNC: 4.2 MMOL/L (ref 3.5–5.1)

## 2018-08-25 PROCEDURE — 74011250637 HC RX REV CODE- 250/637: Performed by: PSYCHIATRY & NEUROLOGY

## 2018-08-25 PROCEDURE — 36415 COLL VENOUS BLD VENIPUNCTURE: CPT | Performed by: FAMILY MEDICINE

## 2018-08-25 PROCEDURE — 84132 ASSAY OF SERUM POTASSIUM: CPT | Performed by: FAMILY MEDICINE

## 2018-08-25 PROCEDURE — 65220000003 HC RM SEMIPRIVATE PSYCH

## 2018-08-25 RX ADMIN — MIRTAZAPINE 15 MG: 15 TABLET, FILM COATED ORAL at 21:45

## 2018-08-25 RX ADMIN — HYDROXYZINE PAMOATE 50 MG: 25 CAPSULE ORAL at 08:34

## 2018-08-25 RX ADMIN — HYDROXYZINE PAMOATE 50 MG: 25 CAPSULE ORAL at 16:01

## 2018-08-25 NOTE — BH NOTES
REFLECTIONS GROUP THERAPY PROGRESS NOTE    The patient Wilfrid Hui is participating in Reflections Group Therapy. Group time: 30 minutes    Personal goal for participation: Share with group about feelings and concerns throughout the course of the day. Goal orientation: personal    Group therapy participation: active    Therapeutic interventions reviewed and discussed: Yes    Impression of participation:   Positive input.     Maribeth Vann  8/24/2018

## 2018-08-25 NOTE — BH NOTES
PSYCHIATRIC PROGRESS NOTE         Patient Name  Safia Burkett   Date of Birth 1985   [facility] [id number]   Medical Record Number  [id number]      Age  29 y.o. PCP None   Admit date:  8/23/2018    Room Number  285/94  @ [facility]   Date of Service  8/25/2018          PSYCHOTHERAPY SESSION NOTE:  Length of psychotherapy session: 10 minutes    Main condition/diagnosis/issues treated during session today, 8/25/2018 : I employed Cognitive Behavioral therapy techniques, Reality-Oriented psychotherapy, as well as supportive psychotherapy in regards to various ongoing psychosocial stressors, including the following: pre-admission and current problems; housing issues; occupational issues; academic issues; legal issues; medical issues; and stress of hospitalization. Interpersonal relationship issues and psychodynamic conflicts explored. Attempts made to alleviate maladaptive patterns. We, also, worked on issues of denial & effects of substance dependency/use     Overall, patient is not progressing    Treatment Plan Update (reviewed an updated 8/25/2018) : I will modify psychotherapy tx plan by implementing more stress management strategies, building upon cognitive behavioral techniques, increasing coping skills, as well as shoring up psychological defenses). An extended energy and skill set was needed to engage pt in psychotherapy due to some of the following: resistiveness, complexity, negativity, confrontational nature, hostile behaviors, and/or severe abnormalities in thought processes/psychosis resulting in the loss of expressive/receptive language communication skills. E & M PROGRESS NOTE:         HISTORY       REASON FOR HOSPITALIZATION:  CC: \"suicidal ideation\". Pt admitted under a voluntary basis for severe depression with suicidal ideations proving to be an imminent danger to self .     HISTORY OF PRESENT ILLNESS:    The patient, Safia Burkett, is a 29 y.o. WHITE [place]  male with a past psychiatric history significant for methamphetamine, cocaine and marijuana use disorders and self reported bipolar, who presents at this time with complaints of (and/or evidence of) the following emotional symptoms: suicidal thoughts/threats. Additional symptomatology include poor concentration. The above symptoms have been present for 2+ months. These symptoms are of moderate severity. These symptoms are constant . The patient's condition has been precipitated by psychosocial stressors. Patient's condition made worse by continued illicit drug use and alcohol use as well as treatment noncompliance. UDS: negative; BAL=0.     On interview, patient vague regarding symptoms. Seeking ADHD medication. States his substance use is not a problem for him presently. Chery Nieto presents/reports/evidences the following emotional symptoms today, 8/25/2018:depression, suicidal thoughts/threats and anxiety. The above symptoms have been present for chronic. These symptoms are of moderate severity. The symptoms are intermittent/ fleeting in nature. Additional symptomatology and features include agitation, anxiety, depression worse, feeling depressed, legal problem and problem with medication. SIDE EFFECTS: (reviewed/updated 8/25/2018)  None reported or admitted to. No noted toxicity with use of Depakote/Tegretol/lithium/Clozaril/TCAs   ALLERGIES:(reviewed/updated 8/25/2018)  No Known Allergies   MEDICATIONS PRIOR TO ADMISSION:(reviewed/updated 8/25/2018)  Prescriptions Prior to Admission   Medication Sig    hydroxyzine pamoate (VISTARIL PO) Take  by mouth.  olanzapine (ZYPREXA PO) Take  by mouth.  mirtazapine (REMERON PO) Take  by mouth.       PAST MEDICAL HISTORY: Past medical history from the initial psychiatric evaluation has been reviewed (reviewed/updated 8/25/2018) with no additional updates (I asked patient and no additional past medical history provided). Past Medical History:   Diagnosis Date    Heart abnormality     Psychiatric disorder     schizophrenia, bipolar    Seizures ([facility] [address]     VSD (ventricular septal defect)      Past Surgical History:   Procedure Laterality Date    HX OTHER SURGICAL      PICC line placement      SOCIAL HISTORY: Social history from the initial psychiatric evaluation has been reviewed (reviewed/updated 8/25/2018) with no additional updates (I asked patient and no additional social history provided). Social History     Social History    Marital status: SINGLE     Spouse name: N/A    Number of children: N/A    Years of education: N/A     Occupational History    Not on file. Social History Main Topics    Smoking status: Current Every Day Smoker    Smokeless tobacco: Not on file    Alcohol use 14.0 oz/week     18 Cans of beer, 10 Shots of liquor per week      Comment: every day    Drug use: 7.00 per week     Special: Marijuana, Methamphetamines, Benzodiazepines, Heroin, Opiates      Comment: clean for about 2 months    Sexual activity: Yes     Other Topics Concern    Not on file     Social History Narrative      FAMILY HISTORY: Family history from the initial psychiatric evaluation has been reviewed (reviewed/updated 8/25/2018) with no additional updates (I asked patient and no additional family history provided). History reviewed. No pertinent family history.     REVIEW OF SYSTEMS: (reviewed/updated 8/25/2018)  Appetite:improved   Sleep: improved   All other Review of Systems: Psychological ROS: negative for - hallucinations         [facility] (MSE):    MSE FINDINGS ARE WITHIN NORMAL LIMITS (WNL) UNLESS OTHERWISE STATED BELOW. ( ALL OF THE BELOW CATEGORIES OF THE MSE HAVE BEEN REVIEWED (reviewed 8/25/2018) AND UPDATED AS DEEMED APPROPRIATE )  General Presentation age appropriate and casually dressed, cooperative and evasive   Orientation oriented to time, place and person Vital Signs  See below (reviewed 8/25/2018); Vital Signs (BP, Pulse, & Temp) are within normal limits if not listed below. Gait and Station Stable/steady, no ataxia   Musculoskeletal System No extrapyramidal symptoms (EPS); no abnormal muscular movements or Tardive Dyskinesia (TD); muscle strength and tone are within normal limits   Language No aphasia or dysarthria   Speech:  normal pitch, normal volume and non-pressured   Thought Processes logical; normal rate of thoughts; fair abstract reasoning/computation   Thought Associations goal directed   Thought Content free of delusions   Suicidal Ideations none   Homicidal Ideations none   Mood:  anxious    Affect:  mood-congruent   Memory recent  fair   Memory remote:  fair   Concentration/Attention:  fair   Fund of Knowledge average   Insight:  limited   Reliability fair   Judgment:  limited          VITALS:     Patient Vitals for the past 24 hrs:   Temp Pulse Resp BP   08/25/18 0639 97.4 °F (36.3 °C) 65 16 96/42   08/24/18 1744 98 °F (36.7 °C) 84 18 115/74     Wt Readings from Last 3 Encounters:   08/23/18 67.2 kg (148 lb 3.2 oz)   10/04/14 70.3 kg (155 lb)   09/19/14 70.3 kg (155 lb)     Temp Readings from Last 3 Encounters:   08/25/18 97.4 °F (36.3 °C)   10/04/14 98.3 °F (36.8 °C)   09/19/14 98.2 °F (36.8 °C)     BP Readings from Last 3 Encounters:   08/25/18 96/42   10/04/14 114/74   09/19/14 127/79     Pulse Readings from Last 3 Encounters:   08/25/18 65   10/04/14 68   09/19/14 (!) 105            DATA     LABORATORY DATA:(reviewed/updated 8/25/2018)  Recent Results (from the past 24 hour(s))   POTASSIUM    Collection Time: 08/25/18  5:19 AM   Result Value Ref Range    Potassium 4.2 3.5 - 5.1 mmol/L     No results found for: VALF2, VALAC, VALP, VALPR, DS6, CRBAM, CRBAMP, CARB2, XCRBAM  No results found for: LITHM   RADIOLOGY REPORTS:(reviewed/updated 8/25/2018)  No results found.        MEDICATIONS     ALL MEDICATIONS:   Current Facility-Administered Medications   Medication Dose Route Frequency    mirtazapine (REMERON) tablet 15 mg  15 mg Oral QHS    hydrOXYzine pamoate (VISTARIL) capsule 50 mg  50 mg Oral TID PRN    ziprasidone (GEODON) 20 mg in sterile water (preservative free) 1 mL injection  20 mg IntraMUSCular BID PRN    OLANZapine (ZyPREXA) tablet 5 mg  5 mg Oral Q6H PRN    benztropine (COGENTIN) tablet 2 mg  2 mg Oral BID PRN    benztropine (COGENTIN) injection 2 mg  2 mg IntraMUSCular BID PRN    LORazepam (ATIVAN) injection 2 mg  2 mg IntraMUSCular Q4H PRN    zolpidem (AMBIEN) tablet 10 mg  10 mg Oral QHS PRN    acetaminophen (TYLENOL) tablet 650 mg  650 mg Oral Q4H PRN    ibuprofen (MOTRIN) tablet 400 mg  400 mg Oral Q8H PRN    magnesium hydroxide (MILK OF MAGNESIA) 400 mg/5 mL oral suspension 30 mL  30 mL Oral DAILY PRN    nicotine (NICODERM CQ) 21 mg/24 hr patch 1 Patch  1 Patch TransDERmal DAILY PRN      SCHEDULED MEDICATIONS:   Current Facility-Administered Medications   Medication Dose Route Frequency    mirtazapine (REMERON) tablet 15 mg  15 mg Oral QHS          ASSESSMENT & PLAN     The patient, Evelyn Huerta, is a 29 y.o.  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 Patient's symptoms and recent history do not appear consistent with Bipolar disorder or any psychotic process. Patient would benefit from resuming his outpatient depression regimen and may benefit from a mood stabilizer. No clear indication for antipsychotic. Patient pre-contemplative regarding the extent to which substance abuse has factored into his presentation; will continue to reinforce substance use treatment.  Unclear outpatient regimen, doses unverified and patient unreliable historian. Plan:   - Observe off substances  - RESTART Vistaril 50 mg TID PRN anxiety  - RESTART Remeron 15 mg QHS for depressive symptoms  - Consider mood stabilizer  - Encourage substance use rehabilitation  - I/G/M therapy as tolerated  - Clarify diagnosis  - HOLD Zyprexa pending confirmation of outpatient dose / regimen           I will continue to monitor blood levels (Depakote, Tegretol, lithium, clozapine---a drug with a narrow therapeutic index= NTI) and associated labs for drug therapy implemented that require intense monitoring for toxicity as deemed appropriate based on current medication side effects and pharmacodynamically determined drug 1/2 lives. In summary, Yan Rogel, is a 29 y.o.  male who presents with a severe exacerbation of the principal diagnosis of Major depressive disorder, single episode, unspecified  Patient's condition is worsening/not improving/not stable improving. Patient requires continued inpatient hospitalization for further stabilization, safety monitoring and medication management. I will continue to coordinate the provision of individual, milieu, occupational, group, and substance abuse therapies to address target symptoms/diagnoses as deemed appropriate for the individual patient. A coordinated, multidisplinary treatment team round was conducted with the patient (this team consists of the nurse, psychiatric unit pharmcist,  and writer). Complete current electronic health record for patient has been reviewed today including consultant notes, ancillary staff notes, nurses and psychiatric tech notes. Suicide risk assessment completed and patient deemed to be of low risk for suicide at this time. The following regarding medications was addressed during rounds with patient:   the risks and benefits of the proposed medication. The patient was given the opportunity to ask questions.  Informed consent given to the use of the above medications. Will continue to adjust psychiatric and non-psychiatric medications (see above \"medication\" section and orders section for details) as deemed appropriate & based upon diagnoses and response to treatment. I will continue to order blood tests/labs and diagnostic tests as deemed appropriate and review results as they become available (see orders for details and above listed lab/test results). I will order psychiatric records from previous [facility] hospitals to further elucidate the nature of patient's psychopathology and review once available. I will gather additional collateral information from friends, family and o/p treatment team to further elucidate the nature of patient's psychopathology and baselline level of psychiatric functioning. I certify that this patient's inpatient psychiatric hospital services furnished since the previous certification were, and continue to be, required for treatment that could reasonably be expected to improve the patient's condition, or for diagnostic study, and that the patient continues to need, on a daily basis, active treatment furnished directly by or requiring the supervision of inpatient psychiatric facility personnel. In addition, the hospital records show that services furnished were intensive treatment services, admission or related services, or equivalent services.     EXPECTED DISCHARGE DATE/DAY: TBD     DISPOSITION: Home       Signed By:   Josefina Tinoco MD  8/25/2018

## 2018-08-25 NOTE — PROGRESS NOTES
Problem: Depressed Mood (Adult/Pediatric)  Goal: *STG: Verbalizes anger, guilt, and other feelings in a constructive manor  Patient has been med and meal compliant. Attending unit activities and group. Interacting with select staff and peers. Denies SI,HI,A/V Hallucinations. No behavioral problem at present. Will continue to monitor patient and behavior every 15 minutes for safety.

## 2018-08-25 NOTE — CONSULTS
[place] [address], [address] [facility]  MR#: [id number]  : 1985  ACCOUNT #: [de-identified]   DATE OF SERVICE: 2018    REFERRING PHYSICIAN:  Alejandro Smith MD    REASON FOR CONSULTATION:  Medical evaluation for psychiatric admission. CHIEF COMPLAINT:  Hallucinations. HISTORY OF PRESENT ILLNESS:  This is a 82-year-old male who presents with acute auditory hallucinations with suicidal ideation without a plan. Denies any chest pain, shortness of breath, nausea, vomiting, or diarrhea. Admits to drug use which he has been in and out in terms of relapsing. He does not have a primary care physician. His drug of choice is marijuana and heroin. PAST MEDICAL HISTORY:  Bipolar, schizophrenia, congenital ventral septal defect, and seizures associated with withdrawing from benzodiazepines. PAST SURGICAL HISTORY:  PICC line placement. ALLERGIES:  NONE. MEDICATIONS:  Vistaril, Zyprexa, Remeron. The patient does not know the dosages, has not been compliant. SOCIAL HISTORY:  He does smoke a pack of cigarettes a day. He drinks usually a 40 ounce beer. Uses marijuana daily if possible and heroin. He had stopped for 3 years but does sniff, has used intravenously in the past, cocaine in the past, but that is not his drug of choice, not used recently. Single, has 2 kids. Works as a [employer]. PHYSICAL EXAMINATION:  VITAL SIGNS:  Temperature is 98.0, blood pressure 116/76, pulse 80, respiration 18, weight 148. GENERAL:  Pleasant, no acute distress. HEAD, EYES, EARS, NOSE AND THROAT:  Oropharynx is clear. NECK:  Supple. LUNGS:  Clear to auscultation. No wheeze, rales, or rhonchi. CARDIOVASCULAR:  Systolic ejection murmur heard on left sternal border with no radiation. Otherwise, regular rhythm and rate. ABDOMEN:  Soft, nontender, nondistended, normoactive bowel sounds. No hepatosplenomegaly. EXTREMITIES:  No cyanosis, clubbing, edema.     LABORATORY DATA: Hemoglobin 12.3, hematocrit 35.3. UA was negative. Sodium 145, potassium 3.1, chloride 107, bicarbonate 30, BUN was 10, creatinine 0.92, glucose 97. TSH is 1.52. Tox screen is negative. Alcohol level less than 10. ASSESSMENT:  A 42-year-old male with past medical history of mental health disease, presents with acute auditory hallucinations and suicidal ideation with a history of substance abuse, admitted for further psychiatric evaluation and treatment. PLAN:  1. Psychiatry management of mental health issues. 2.  Recheck potassium and replete as indicated. 3.  Medically stable. Follow up on a p.r.n. basis. 4.  No VTE prophylaxis indicated or warranted at this time.       Ashanti Jimenez MD DC / ALEXANDRA  D: 08/25/2018 09:46     T: 08/25/2018 13:42  JOB #: [id number]

## 2018-08-25 NOTE — PROGRESS NOTES
Problem: Depressed Mood (Adult/Pediatric)  Goal: *STG: Participates in treatment plan  Outcome: Progressing Towards Goal  Pt rested quietly in bed with eyes closed. No signs/symptoms of distress, agitation, or anxiety. Will monitor for changes. Q 15 minute checks continue.  Pt slept 7 hrs

## 2018-08-25 NOTE — BH NOTES
Spent 1:1 time with patient at window, regarding his progress. Encouragement offered regarding future life choices. Gave Vistaril  50 mg. For anxiety twice. Patient talked of leaving AMA, very early  In the shift. Visibly anxious. Demonstrated becoming more congenial  And calm. Given ordered Remeron at HS. Will continue to monitor patient's status and assess needs.

## 2018-08-26 PROCEDURE — 65220000003 HC RM SEMIPRIVATE PSYCH

## 2018-08-26 PROCEDURE — 74011250637 HC RX REV CODE- 250/637: Performed by: PSYCHIATRY & NEUROLOGY

## 2018-08-26 RX ORDER — MIRTAZAPINE 15 MG/1
22.5 TABLET, FILM COATED ORAL
Status: DISCONTINUED | OUTPATIENT
Start: 2018-08-26 | End: 2018-08-27 | Stop reason: HOSPADM

## 2018-08-26 RX ADMIN — HYDROXYZINE PAMOATE 50 MG: 25 CAPSULE ORAL at 13:04

## 2018-08-26 RX ADMIN — MIRTAZAPINE 22.5 MG: 15 TABLET, FILM COATED ORAL at 21:05

## 2018-08-26 RX ADMIN — HYDROXYZINE PAMOATE 50 MG: 25 CAPSULE ORAL at 21:05

## 2018-08-26 NOTE — BH NOTES
PRN Vistaril administered, per patient request and tolerated well. Patient complained of diarrhea but stated that he would wait and see the doctor and request some Pepto.

## 2018-08-26 NOTE — BH NOTES
PSYCHIATRIC PROGRESS NOTE         Patient Name  Bladimir Shelley   Date of Birth 1985   [facility] [id number]   Medical Record Number  [id number]      Age  29 y.o. PCP None   Admit date:  8/23/2018    Room Number  900/77  @ [facility]   Date of Service  8/26/2018          PSYCHOTHERAPY SESSION NOTE:  Length of psychotherapy session: 10 minutes    Main condition/diagnosis/issues treated during session today, 8/26/2018 : I employed Cognitive Behavioral therapy techniques, Reality-Oriented psychotherapy, as well as supportive psychotherapy in regards to various ongoing psychosocial stressors, including the following: pre-admission and current problems; housing issues; occupational issues; academic issues; legal issues; medical issues; and stress of hospitalization. Interpersonal relationship issues and psychodynamic conflicts explored. Attempts made to alleviate maladaptive patterns. We, also, worked on issues of denial & effects of substance dependency/use     Overall, patient is not progressing    Treatment Plan Update (reviewed an updated 8/26/2018) : I will modify psychotherapy tx plan by implementing more stress management strategies, building upon cognitive behavioral techniques, increasing coping skills, as well as shoring up psychological defenses). An extended energy and skill set was needed to engage pt in psychotherapy due to some of the following: resistiveness, complexity, negativity, confrontational nature, hostile behaviors, and/or severe abnormalities in thought processes/psychosis resulting in the loss of expressive/receptive language communication skills. E & M PROGRESS NOTE:         HISTORY       REASON FOR HOSPITALIZATION:  CC: \"suicidal ideation\". Pt admitted under a voluntary basis for severe depression with suicidal ideations proving to be an imminent danger to self .     HISTORY OF PRESENT ILLNESS:    The patient, Bladimir Shelley, is a 29 y.o. WHITE [place]  male with a past psychiatric history significant for methamphetamine, cocaine and marijuana use disorders and self reported bipolar, who presents at this time with complaints of (and/or evidence of) the following emotional symptoms: suicidal thoughts/threats. Additional symptomatology include poor concentration. The above symptoms have been present for 2+ months. These symptoms are of moderate severity. These symptoms are constant . The patient's condition has been precipitated by psychosocial stressors. Patient's condition made worse by continued illicit drug use and alcohol use as well as treatment noncompliance. UDS: negative; BAL=0.     On interview, patient vague regarding symptoms. Seeking ADHD medication. States his substance use is not a problem for him presently. Yan Rogel presents/reports/evidences the following emotional symptoms today, 8/26/2018:depression, suicidal thoughts/threats and anxiety. The above symptoms have been present for chronic. These symptoms are of moderate severity. The symptoms are intermittent/ fleeting in nature. Additional symptomatology and features include agitation, anxiety, depression worse, feeling depressed, legal problem and problem with medication. C/O vague SI, contracts for safety. Remeron increased to 22.5 mg qhs      SIDE EFFECTS: (reviewed/updated 8/26/2018)  None reported or admitted to. No noted toxicity with use of Depakote/Tegretol/lithium/Clozaril/TCAs   ALLERGIES:(reviewed/updated 8/26/2018)  No Known Allergies   MEDICATIONS PRIOR TO ADMISSION:(reviewed/updated 8/26/2018)  Prescriptions Prior to Admission   Medication Sig    hydroxyzine pamoate (VISTARIL PO) Take  by mouth.  olanzapine (ZYPREXA PO) Take  by mouth.  mirtazapine (REMERON PO) Take  by mouth.       PAST MEDICAL HISTORY: Past medical history from the initial psychiatric evaluation has been reviewed (reviewed/updated 8/26/2018) with no additional updates (I asked patient and no additional past medical history provided). Past Medical History:   Diagnosis Date    Heart abnormality     Psychiatric disorder     schizophrenia, bipolar    Seizures ([facility] [address]     VSD (ventricular septal defect)      Past Surgical History:   Procedure Laterality Date    HX OTHER SURGICAL      PICC line placement      SOCIAL HISTORY: Social history from the initial psychiatric evaluation has been reviewed (reviewed/updated 8/26/2018) with no additional updates (I asked patient and no additional social history provided). Social History     Social History    Marital status: SINGLE     Spouse name: N/A    Number of children: N/A    Years of education: N/A     Occupational History    Not on file. Social History Main Topics    Smoking status: Current Every Day Smoker    Smokeless tobacco: Not on file    Alcohol use 14.0 oz/week     18 Cans of beer, 10 Shots of liquor per week      Comment: every day    Drug use: 7.00 per week     Special: Marijuana, Methamphetamines, Benzodiazepines, Heroin, Opiates      Comment: clean for about 2 months    Sexual activity: Yes     Other Topics Concern    Not on file     Social History Narrative      FAMILY HISTORY: Family history from the initial psychiatric evaluation has been reviewed (reviewed/updated 8/26/2018) with no additional updates (I asked patient and no additional family history provided). History reviewed. No pertinent family history.     REVIEW OF SYSTEMS: (reviewed/updated 8/26/2018)  Appetite:improved   Sleep: improved   All other Review of Systems: Psychological ROS: negative for - hallucinations         [facility] (MSE):    MSE FINDINGS ARE WITHIN NORMAL LIMITS (WNL) UNLESS OTHERWISE STATED BELOW. ( ALL OF THE BELOW CATEGORIES OF THE MSE HAVE BEEN REVIEWED (reviewed 8/26/2018) AND UPDATED AS DEEMED APPROPRIATE )  General Presentation age appropriate and casually dressed, cooperative and evasive   Orientation oriented to time, place and person   Vital Signs  See below (reviewed 8/26/2018); Vital Signs (BP, Pulse, & Temp) are within normal limits if not listed below. Gait and Station Stable/steady, no ataxia   Musculoskeletal System No extrapyramidal symptoms (EPS); no abnormal muscular movements or Tardive Dyskinesia (TD); muscle strength and tone are within normal limits   Language No aphasia or dysarthria   Speech:  normal pitch, normal volume and non-pressured   Thought Processes logical; normal rate of thoughts; fair abstract reasoning/computation   Thought Associations goal directed   Thought Content free of delusions   Suicidal Ideations Vague, contracts for safety   Homicidal Ideations none   Mood:  anxious    Affect:  mood-congruent   Memory recent  fair   Memory remote:  fair   Concentration/Attention:  fair   Fund of Knowledge average   Insight:  limited   Reliability fair   Judgment:  limited          VITALS:     Patient Vitals for the past 24 hrs:   Temp Pulse Resp BP   08/26/18 0654 97.6 °F (36.4 °C) 77 16 91/55   08/25/18 2000 98.2 °F (36.8 °C) 71 18 95/53     Wt Readings from Last 3 Encounters:   08/23/18 67.2 kg (148 lb 3.2 oz)   10/04/14 70.3 kg (155 lb)   09/19/14 70.3 kg (155 lb)     Temp Readings from Last 3 Encounters:   08/26/18 97.6 °F (36.4 °C)   10/04/14 98.3 °F (36.8 °C)   09/19/14 98.2 °F (36.8 °C)     BP Readings from Last 3 Encounters:   08/26/18 91/55   10/04/14 114/74   09/19/14 127/79     Pulse Readings from Last 3 Encounters:   08/26/18 77   10/04/14 68   09/19/14 (!) 105            DATA     LABORATORY DATA:(reviewed/updated 8/26/2018)  No results found for this or any previous visit (from the past 24 hour(s)). No results found for: VALF2, VALAC, VALP, VALPR, DS6, CRBAM, CRBAMP, CARB2, XCRBAM  No results found for: LITHM   RADIOLOGY REPORTS:(reviewed/updated 8/26/2018)  No results found.        MEDICATIONS     ALL MEDICATIONS:   Current Facility-Administered Medications   Medication Dose Route Frequency    mirtazapine (REMERON) tablet 22.5 mg  22.5 mg Oral QHS    hydrOXYzine pamoate (VISTARIL) capsule 50 mg  50 mg Oral TID PRN    ziprasidone (GEODON) 20 mg in sterile water (preservative free) 1 mL injection  20 mg IntraMUSCular BID PRN    OLANZapine (ZyPREXA) tablet 5 mg  5 mg Oral Q6H PRN    benztropine (COGENTIN) tablet 2 mg  2 mg Oral BID PRN    benztropine (COGENTIN) injection 2 mg  2 mg IntraMUSCular BID PRN    LORazepam (ATIVAN) injection 2 mg  2 mg IntraMUSCular Q4H PRN    zolpidem (AMBIEN) tablet 10 mg  10 mg Oral QHS PRN    acetaminophen (TYLENOL) tablet 650 mg  650 mg Oral Q4H PRN    ibuprofen (MOTRIN) tablet 400 mg  400 mg Oral Q8H PRN    magnesium hydroxide (MILK OF MAGNESIA) 400 mg/5 mL oral suspension 30 mL  30 mL Oral DAILY PRN    nicotine (NICODERM CQ) 21 mg/24 hr patch 1 Patch  1 Patch TransDERmal DAILY PRN      SCHEDULED MEDICATIONS:   Current Facility-Administered Medications   Medication Dose Route Frequency    mirtazapine (REMERON) tablet 22.5 mg  22.5 mg Oral QHS          ASSESSMENT & PLAN     The patient, aJki Moran, is a 29 y.o.  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 Patient's symptoms and recent history do not appear consistent with Bipolar disorder or any psychotic process. Patient would benefit from resuming his outpatient depression regimen and may benefit from a mood stabilizer. No clear indication for antipsychotic. Patient pre-contemplative regarding the extent to which substance abuse has factored into his presentation; will continue to reinforce substance use treatment.  Unclear outpatient regimen, doses unverified and patient unreliable historian. Plan:   - Observe off substances  - RESTART Vistaril 50 mg TID PRN anxiety  - RESTART Remeron 15 mg QHS for depressive symptoms. 8/26- Dosage increased to 22.5 mg qhs.  - Consider mood stabilizer  - Encourage substance use rehabilitation  - I/G/M therapy as tolerated  - Clarify diagnosis  - HOLD Zyprexa pending confirmation of outpatient dose / regimen           I will continue to monitor blood levels (Depakote, Tegretol, lithium, clozapine---a drug with a narrow therapeutic index= NTI) and associated labs for drug therapy implemented that require intense monitoring for toxicity as deemed appropriate based on current medication side effects and pharmacodynamically determined drug 1/2 lives. In summary, Yan Rogel, is a 29 y.o.  male who presents with a severe exacerbation of the principal diagnosis of Major depressive disorder, single episode, unspecified  Patient's condition is worsening/not improving/not stable improving. Patient requires continued inpatient hospitalization for further stabilization, safety monitoring and medication management. I will continue to coordinate the provision of individual, milieu, occupational, group, and substance abuse therapies to address target symptoms/diagnoses as deemed appropriate for the individual patient. A coordinated, multidisplinary treatment team round was conducted with the patient (this team consists of the nurse, psychiatric unit pharmcist,  and writer). Complete current electronic health record for patient has been reviewed today including consultant notes, ancillary staff notes, nurses and psychiatric tech notes. Suicide risk assessment completed and patient deemed to be of low risk for suicide at this time. The following regarding medications was addressed during rounds with patient:   the risks and benefits of the proposed medication.  The patient was given the opportunity to ask questions. Informed consent given to the use of the above medications. Will continue to adjust psychiatric and non-psychiatric medications (see above \"medication\" section and orders section for details) as deemed appropriate & based upon diagnoses and response to treatment. I will continue to order blood tests/labs and diagnostic tests as deemed appropriate and review results as they become available (see orders for details and above listed lab/test results). I will order psychiatric records from previous [facility] hospitals to further elucidate the nature of patient's psychopathology and review once available. I will gather additional collateral information from friends, family and o/p treatment team to further elucidate the nature of patient's psychopathology and baselline level of psychiatric functioning. I certify that this patient's inpatient psychiatric hospital services furnished since the previous certification were, and continue to be, required for treatment that could reasonably be expected to improve the patient's condition, or for diagnostic study, and that the patient continues to need, on a daily basis, active treatment furnished directly by or requiring the supervision of inpatient psychiatric facility personnel. In addition, the hospital records show that services furnished were intensive treatment services, admission or related services, or equivalent services.     EXPECTED DISCHARGE DATE/DAY: TBD     DISPOSITION: Home       Signed By:   Julián Joyce MD  8/26/2018

## 2018-08-26 NOTE — BH NOTES
GROUP THERAPY PROGRESS NOTE    The patient Ev Cuellar is participating in [employer]. Group time: 30 minutes    Personal goal for participation: to orient the patient to the unit.     Goal orientation: successful adoption of unit rules    Group therapy participation: active    Therapeutic interventions reviewed and discussed: Yes    Impression of participation:     Chip Chaney  8/26/2018 9:03 AM

## 2018-08-26 NOTE — BH NOTES
During free time pt observed resting in bed affect flat,sad however, pleasant upon approach. Staff did initiate 1:1 with no new self disclosure noted. Also he was encouraged to work his program by continue staying compliant with meds,participating in all activities,and following his unit rules.  He will be monitor q15min

## 2018-08-26 NOTE — BH NOTES
Behavior  The patient Yan Rogel is alert and oriented.  His hygiene and nutritional intake  is adequate.  Their behavior is appropriate in the milieu with peers and staff, but presents isolative.  Contracts for safety.  Pt medication compliant. Per medication nurse. Flat affect.  Depressed, sad mood.        Intervention  1:1 interaction to assess mood and thought process. Staff offering assistance as needed.     Response  Pt denies S/H ideations. Pt denies hearing voices At this  times. Pt attending groups. Plan  Plan is to assess mood and thought process Staff encouraging verbalization of issues and feelings in the Appropriate manner Staff will monitor for changes.  Q 15 minute checks continue.

## 2018-08-26 NOTE — PROGRESS NOTES
Problem: Depressed Mood (Adult/Pediatric)  Goal: *STG: Participates in treatment plan  Outcome: Progressing Towards Goal  Pt slept 7 hours overnight. No med/beh concerns noted. Staff will continue to monitor for health and safety.

## 2018-08-27 VITALS
DIASTOLIC BLOOD PRESSURE: 78 MMHG | OXYGEN SATURATION: 98 % | TEMPERATURE: 97.4 F | RESPIRATION RATE: 18 BRPM | HEIGHT: 68 IN | BODY MASS INDEX: 22.46 KG/M2 | SYSTOLIC BLOOD PRESSURE: 92 MMHG | HEART RATE: 71 BPM | WEIGHT: 148.2 LBS

## 2018-08-27 PROCEDURE — 74011250637 HC RX REV CODE- 250/637: Performed by: PSYCHIATRY & NEUROLOGY

## 2018-08-27 RX ORDER — MIRTAZAPINE 7.5 MG/1
22.5 TABLET, FILM COATED ORAL
Qty: 90 TAB | Refills: 0 | Status: SHIPPED | OUTPATIENT
Start: 2018-08-27

## 2018-08-27 RX ORDER — HYDROXYZINE PAMOATE 50 MG/1
50 CAPSULE ORAL
Qty: 90 CAP | Refills: 0 | Status: SHIPPED | OUTPATIENT
Start: 2018-08-27 | End: 2018-09-10

## 2018-08-27 RX ADMIN — HYDROXYZINE PAMOATE 50 MG: 25 CAPSULE ORAL at 08:06

## 2018-08-27 RX ADMIN — HYDROXYZINE PAMOATE 50 MG: 25 CAPSULE ORAL at 11:52

## 2018-08-27 NOTE — DISCHARGE SUMMARY
PSYCHIATRIC DISCHARGE SUMMARY         IDENTIFICATION:    Patient Name  Jessa Farias   Date of Birth 1985   [facility] [id number]   Medical Record Number  [id number]      Age  29 y.o. PCP None   Admit date:  8/23/2018    Discharge date: 8/27/2018   Room Number  026/72  @ [facility]   Date of Service  8/27/2018            TYPE OF DISCHARGE: REGULAR               CONDITION AT DISCHARGE: improved and fair       PROVISIONAL & DISCHARGE DIAGNOSES:    Problem List  Never Reviewed          Codes Class    Adjustment disorder with depressed mood ICD-10-CM: F43.21  ICD-9-CM: 309.0         * (Principal)Major depressive disorder, single episode, unspecified ICD-10-CM: F32.9  ICD-9-CM: 296.20         Methamphetamine use disorder, moderate ([address] ICD-10-CM: F15.20  ICD-9-CM: 305.70               Active Hospital Problems    Adjustment disorder with depressed mood      *Major depressive disorder, single episode, unspecified      Methamphetamine use disorder, moderate ([address]        DISCHARGE DIAGNOSIS:   Axis I:  SEE ABOVE  Axis II: SEE ABOVE  Axis III: SEE ABOVE  Axis IV:  lack of structure  Axis V:  50 on admission, 60 on discharge 80(baseline)       CC & HISTORY OF PRESENT ILLNESS:  \"Suicidal Ideation\"    The patient, Jessa Farias, is a 29 y.o.  WHITE OR  male with a past psychiatric history significant for methamphetamine, cocaine and marijuana use disorders and self reported bipolar, who presents at this time with complaints of (and/or evidence of) the following emotional symptoms: suicidal thoughts/threats.  Additional symptomatology include poor concentration.  The above symptoms have been present for 2+ months. These symptoms are of moderate severity. These symptoms are constant .  The patient's condition has been precipitated by psychosocial stressors.  Patient's condition made worse by continued illicit drug use and alcohol use as well as treatment noncompliance.  UDS: negative; BAL=0.      On interview, patient vague regarding symptoms. Seeking ADHD medication. States his substance use is not a problem for him presently.      Prem Guadarrama presents/reports/evidences the following emotional symptoms today, 8/26/2018:depression, suicidal thoughts/threats and anxiety. The above symptoms have been present for chronic. These symptoms are of moderate severity. The symptoms are intermittent/ fleeting in nature. Additional symptomatology and features include agitation, anxiety, depression worse, feeling depressed, legal problem and problem with medication. C/O vague SI, contracts for safety. Remeron increased to 22.5 mg qhs     SOCIAL HISTORY:    Social History     Social History    Marital status: SINGLE     Spouse name: N/A    Number of children: N/A    Years of education: N/A     Occupational History    Not on file. Social History Main Topics    Smoking status: Current Every Day Smoker    Smokeless tobacco: Not on file    Alcohol use 14.0 oz/week     18 Cans of beer, 10 Shots of liquor per week      Comment: every day    Drug use: 7.00 per week     Special: Marijuana, Methamphetamines, Benzodiazepines, Heroin, Opiates      Comment: clean for about 2 months    Sexual activity: Yes     Other Topics Concern    Not on file     Social History Narrative      FAMILY HISTORY:   History reviewed. No pertinent family history. HOSPITALIZATION COURSE:    Prem Guadarrama was admitted to the inpatient psychiatric unit [facility] for acute psychiatric stabilization in regards to symptomatology as described in the HPI above. The differential diagnosis at time of admission included: Cocaine/amphetamine/MJ induced depressive disorder vs major depressive disorder, recurrent moderate. While on the unit Prem Guadarrama was involved in individual, group, occupational and milieu therapy.   Psychiatric medications were adjusted during this hospitalization including Remeron. Murray Briones demonstrated a slow, but progressive improvement in overall condition. Much of patient's depression appeared to be related to effects of drugs of abuse. Please see individual progress notes for more specific details regarding patient's hospitalization course. At time of discharge, Murray Briones is without significant problems of depression or withdrawal. Patient free of suicidal and homicidal ideations (appears to be at very low risk of suicide or homicide) and reports many positive predictive factors in terms of not attempting suicide or homicide. Overall presentation at time of discharge is most consistent with the diagnosis of major depressive disorder. Patient with request for discharge today. There are no grounds to seek a TDO. Patient has maximized benefit to be derived from acute inpatient psychiatric treatment. All members of the treatment team concur with each other in regards to plans for discharge today per patient's request.  Patient and family are aware and in agreement with discharge and discharge plan.            LABS AND IMAGAING:    Labs Reviewed   METABOLIC PANEL, COMPREHENSIVE - Abnormal; Notable for the following:        Result Value    Potassium 3.1 (*)     BUN/Creatinine ratio 11 (*)     Albumin 3.4 (*)     All other components within normal limits   CBC WITH AUTOMATED DIFF - Abnormal; Notable for the following:     RBC 3.84 (*)     HCT 35.3 (*)     PLATELET 554 (*)     All other components within normal limits   LIPID PANEL - Abnormal; Notable for the following:     Triglyceride 150 (*)     All other components within normal limits   ETHYL ALCOHOL   DRUG SCREEN, URINE   URINALYSIS W/ RFLX MICROSCOPIC   TSH 3RD GENERATION   GLUCOSE, FASTING   POTASSIUM     No results found for: DS35, PHEN, PHENO, PHENT, DILF, DS39, PHENY, PTN, VALF2, VALAC, VALP, VALPR, DS6, CRBAM, CRBAMP, CARB2, XCRBAM  Admission on 08/23/2018, Discharged on 08/27/2018   Component Date Value Ref Range Status    Sodium 08/23/2018 145  136 - 145 mmol/L Final    Potassium 08/23/2018 3.1* 3.5 - 5.1 mmol/L Final    Chloride 08/23/2018 107  97 - 108 mmol/L Final    CO2 08/23/2018 30  21 - 32 mmol/L Final    Anion gap 08/23/2018 8  5 - 15 mmol/L Final    Glucose 08/23/2018 97  65 - 100 mg/dL Final    BUN 08/23/2018 10  6 - 20 MG/DL Final    Creatinine 08/23/2018 0.92  0.70 - 1.30 MG/DL Final    BUN/Creatinine ratio 08/23/2018 11* 12 - 20   Final    GFR est AA 08/23/2018 >60  >60 ml/min/1.73m2 Final    GFR est non-AA 08/23/2018 >60  >60 ml/min/1.73m2 Final    Calcium 08/23/2018 8.5  8.5 - 10.1 MG/DL Final    Bilirubin, total 08/23/2018 0.2  0.2 - 1.0 MG/DL Final    ALT (SGPT) 08/23/2018 26  12 - 78 U/L Final    AST (SGOT) 08/23/2018 20  15 - 37 U/L Final    Alk.  phosphatase 08/23/2018 46  45 - 117 U/L Final    Protein, total 08/23/2018 6.5  6.4 - 8.2 g/dL Final    Albumin 08/23/2018 3.4* 3.5 - 5.0 g/dL Final    Globulin 08/23/2018 3.1  2.0 - 4.0 g/dL Final    A-G Ratio 08/23/2018 1.1  1.1 - 2.2   Final    ALCOHOL(ETHYL),SERUM 08/23/2018 <10  <10 MG/DL Final    WBC 08/23/2018 6.3  4.1 - 11.1 K/uL Final    RBC 08/23/2018 3.84* 4.10 - 5.70 M/uL Final    HGB 08/23/2018 12.3  12.1 - 17.0 g/dL Final    HCT 08/23/2018 35.3* 36.6 - 50.3 % Final    MCV 08/23/2018 91.9  80.0 - 99.0 FL Final    MCH 08/23/2018 32.0  26.0 - 34.0 PG Final    MCHC 08/23/2018 34.8  30.0 - 36.5 g/dL Final    RDW 08/23/2018 12.6  11.5 - 14.5 % Final    PLATELET 63/11/9555 231* 150 - 400 K/uL Final    MPV 08/23/2018 9.8  8.9 - 12.9 FL Final    NRBC 08/23/2018 0.0  0  WBC Final    ABSOLUTE NRBC 08/23/2018 0.00  0.00 - 0.01 K/uL Final    NEUTROPHILS 08/23/2018 57  32 - 75 % Final    LYMPHOCYTES 08/23/2018 29  12 - 49 % Final    MONOCYTES 08/23/2018 11  5 - 13 % Final    EOSINOPHILS 08/23/2018 3  0 - 7 % Final    BASOPHILS 08/23/2018 1  0 - 1 % Final    IMMATURE GRANULOCYTES 08/23/2018 0  0.0 - 0.5 % Final    ABS. NEUTROPHILS 08/23/2018 3.6  1.8 - 8.0 K/UL Final    ABS. LYMPHOCYTES 08/23/2018 1.8  0.8 - 3.5 K/UL Final    ABS. MONOCYTES 08/23/2018 0.7  0.0 - 1.0 K/UL Final    ABS. EOSINOPHILS 08/23/2018 0.2  0.0 - 0.4 K/UL Final    ABS. BASOPHILS 08/23/2018 0.0  0.0 - 0.1 K/UL Final    ABS. IMM.  GRANS. 08/23/2018 0.0  0.00 - 0.04 K/UL Final    DF 08/23/2018 AUTOMATED    Final    AMPHETAMINES 08/23/2018 NEGATIVE   NEG   Final    BARBITURATES 08/23/2018 NEGATIVE   NEG   Final    BENZODIAZEPINES 08/23/2018 NEGATIVE   NEG   Final    COCAINE 08/23/2018 NEGATIVE   NEG   Final    METHADONE 08/23/2018 NEGATIVE   NEG   Final    OPIATES 08/23/2018 NEGATIVE   NEG   Final    PCP(PHENCYCLIDINE) 08/23/2018 NEGATIVE   NEG   Final    THC (TH-CANNABINOL) 08/23/2018 NEGATIVE   NEG   Final    Drug screen comment 08/23/2018 (NOTE)   Final    Ventricular Rate 08/23/2018 67  BPM Final    Atrial Rate 08/23/2018 67  BPM Final    P-R Interval 08/23/2018 120  ms Final    QRS Duration 08/23/2018 118  ms Final    Q-T Interval 08/23/2018 440  ms Final    QTC Calculation (Bezet) 08/23/2018 464  ms Final    Calculated P Axis 08/23/2018 57  degrees Final    Calculated R Axis 08/23/2018 71  degrees Final    Calculated T Axis 08/23/2018 54  degrees Final    Diagnosis 08/23/2018    Final                    Value:Normal sinus rhythm with borderline short TN interval  Incomplete right bundle branch block  No previous ECGs available  Confirmed by Willard Raymond ([id number]) on 8/24/2018 8:23:29 AM      Color 08/23/2018 YELLOW/STRAW    Final    Appearance 08/23/2018 CLEAR  CLEAR   Final    Specific gravity 08/23/2018 1.010  1.003 - 1.030   Final    pH (UA) 08/23/2018 6.0  5.0 - 8.0   Final    Protein 08/23/2018 NEGATIVE   NEG mg/dL Final    Glucose 08/23/2018 NEGATIVE   NEG mg/dL Final    Ketone 08/23/2018 NEGATIVE   NEG mg/dL Final    Bilirubin 08/23/2018 NEGATIVE   NEG   Final    Blood 08/23/2018 NEGATIVE   NEG Final    Urobilinogen 08/23/2018 0.2  0.2 - 1.0 EU/dL Final    Nitrites 08/23/2018 NEGATIVE   NEG   Final    Leukocyte Esterase 08/23/2018 NEGATIVE   NEG   Final    TSH 08/24/2018 1.52  0.36 - 3.74 uIU/mL Final    LIPID PROFILE 08/24/2018        Final    Cholesterol, total 08/24/2018 93  <200 MG/DL Final    Triglyceride 08/24/2018 150* <150 MG/DL Final    HDL Cholesterol 08/24/2018 40  MG/DL Final    LDL, calculated 08/24/2018 23  0 - 100 MG/DL Final    VLDL, calculated 08/24/2018 30  MG/DL Final    CHOL/HDL Ratio 08/24/2018 2.3  0 - 5.0   Final    Glucose 08/24/2018 97  65 - 100 MG/DL Final    Potassium 08/25/2018 4.2  3.5 - 5.1 mmol/L Final     No results found. DISPOSITION:    Home. Patient to f/u with drug/etoh rehabilitation, psychiatric, and psychotherapy appointments. Patient is to f/u with internist as directed. FOLLOW-UP CARE:    Activity as tolerated  Regular Diet  Wound Care: none needed. Follow-up Information     Follow up With Details Comments [id number] Nicolasa Tay Call today For outpatient susbstance services Intake appointment for services. Due to the limited slots, you may want to arrive by 7:00am. The doors open at 7:30am and you should sign in at the registration desk. [address]  Hours: Monday-Friday 8:30am- 12:00PM & 1:00PM-4:30pm   [phone] (phone)  [phone]  (fax)      [facility] [address] [place] [address] CS[facility] for mental health and susbtance abuse medication  management, therapy and case management serivces. [address]  [phone]      [facility] This is the inpatient rehabilitation facility for substance abuse you will be going to today.   [address] [place] [address],[address]    [address] [facility], [address]  [phone]  Housing Crisis Intake Line [phone] PROGNOSIS:   Poor---- based on nature of patient's pathology/ies and treatment compliance issues. Prognosis is greatly dependent upon patient's ability to remain sober and to follow up with drug/etoh rehabilitation and psychiatric/psychotherapy appointments as well as to comply with psychiatric medications as prescribed. DISCHARGE MEDICATIONS:    Informed consent given for the use of following psychotropic medications:  Discharge Medication List as of 8/27/2018 11:44 AM      CONTINUE these medications which have CHANGED    Details   !! hydrOXYzine pamoate (VISTARIL) 50 mg capsule Take 1 Cap by mouth three (3) times daily as needed for Anxiety for up to 14 days. Indications: anxiety, Print, Disp-90 Cap, R-0      !! mirtazapine (REMERON) 7.5 mg tablet Take 3 Tabs by mouth nightly. Indications: major depressive disorder, Print, Disp-90 Tab, R-0       !! - Potential duplicate medications found. Please discuss with provider. CONTINUE these medications which have NOT CHANGED    Details   !! hydroxyzine pamoate (VISTARIL PO) Take  by mouth., Historical Med      !! mirtazapine (REMERON PO) Take  by mouth., Historical Med       !! - Potential duplicate medications found. Please discuss with provider. STOP taking these medications       olanzapine (ZYPREXA PO) Comments:   Reason for Stopping:                      A coordinated, multidisplinary treatment team round was conducted with Naya Cobos is done daily here at [facility]. This team consists of the nurse, psychiatric unit pharmcist,  and writer. I have spent greater than 35 minutes on discharge work.     Signed:  Bety Olivia MD  8/27/2018

## 2018-08-27 NOTE — BH NOTES
Behavioral Health Transition Record to Provider    Patient Name: Fabricio Sigala  YOB: 1985  Medical Record Number: [id number]  Date of Admission: 8/23/2018  Date of Discharge: 8/27/2018    Attending Provider: Michele Patricia MD  Discharging Provider: Andres Coello MD  To contact this individual call [phone] and ask the  to page. If unavailable, ask to be transferred to [facility] Provider on call. [facility] Provider will be available on call 24/7 and during holidays. Primary Care Provider: None    No Known Allergies    Reason for Admission: Pt was admitted voluntarily due to reporting suicidal ideation with plan to cut or hang self while attending [facility] intake appointment. Pt was released from custodial 2 days ago - where he spent 42 days after trespassing charge - he was held due to 2 failure to appear in court for possession charges. Pt reports resuming medication while in custodial remeron, Zyprexa and visteral - now off medication - focused on receiving  stimulants. Pt's stressors include homelessness, lack of structure, unemployment, legal issues and condition worsened by continued illicit drug use and treatment noncompliance. Prior to custodial stint pt was admitted to [facility] for similar thoughts. Pt reports 5 prior psych hospitalizations. Pt vebalized interest for inpatient substance abuse rehabilitation - no prior programs - would like to remain sober and become employed. Pt's mother, Alejandrina Luke whom pt considers his greatest support was called - mother is currently taking care of pt's 9year old daughter who stays with biological mother and pt's mother. Per mother's report pt abuses street and prescription drugs. Pt carries bipolar diagnosis. Pt's mother reports she is proud of him for making the decision to enter inpatient rehab.     Admission Diagnosis: Bipolar disorder ([facility] [address]    * No surgery found *    Results for orders placed or performed during the hospital encounter of 96/92/73   METABOLIC PANEL, COMPREHENSIVE   Result Value Ref Range    Sodium 145 136 - 145 mmol/L    Potassium 3.1 (L) 3.5 - 5.1 mmol/L    Chloride 107 97 - 108 mmol/L    CO2 30 21 - 32 mmol/L    Anion gap 8 5 - 15 mmol/L    Glucose 97 65 - 100 mg/dL    BUN 10 6 - 20 MG/DL    Creatinine 0.92 0.70 - 1.30 MG/DL    BUN/Creatinine ratio 11 (L) 12 - 20      GFR est AA >60 >60 ml/min/1.73m2    GFR est non-AA >60 >60 ml/min/1.73m2    Calcium 8.5 8.5 - 10.1 MG/DL    Bilirubin, total 0.2 0.2 - 1.0 MG/DL    ALT (SGPT) 26 12 - 78 U/L    AST (SGOT) 20 15 - 37 U/L    Alk. phosphatase 46 45 - 117 U/L    Protein, total 6.5 6.4 - 8.2 g/dL    Albumin 3.4 (L) 3.5 - 5.0 g/dL    Globulin 3.1 2.0 - 4.0 g/dL    A-G Ratio 1.1 1.1 - 2.2     ETHYL ALCOHOL   Result Value Ref Range    ALCOHOL(ETHYL),SERUM <10 <10 MG/DL   CBC WITH AUTOMATED DIFF   Result Value Ref Range    WBC 6.3 4.1 - 11.1 K/uL    RBC 3.84 (L) 4.10 - 5.70 M/uL    HGB 12.3 12.1 - 17.0 g/dL    HCT 35.3 (L) 36.6 - 50.3 %    MCV 91.9 80.0 - 99.0 FL    MCH 32.0 26.0 - 34.0 PG    MCHC 34.8 30.0 - 36.5 g/dL    RDW 12.6 11.5 - 14.5 %    PLATELET 512 (L) 730 - 400 K/uL    MPV 9.8 8.9 - 12.9 FL    NRBC 0.0 0  WBC    ABSOLUTE NRBC 0.00 0.00 - 0.01 K/uL    NEUTROPHILS 57 32 - 75 %    LYMPHOCYTES 29 12 - 49 %    MONOCYTES 11 5 - 13 %    EOSINOPHILS 3 0 - 7 %    BASOPHILS 1 0 - 1 %    IMMATURE GRANULOCYTES 0 0.0 - 0.5 %    ABS. NEUTROPHILS 3.6 1.8 - 8.0 K/UL    ABS. LYMPHOCYTES 1.8 0.8 - 3.5 K/UL    ABS. MONOCYTES 0.7 0.0 - 1.0 K/UL    ABS. EOSINOPHILS 0.2 0.0 - 0.4 K/UL    ABS. BASOPHILS 0.0 0.0 - 0.1 K/UL    ABS. IMM.  GRANS. 0.0 0.00 - 0.04 K/UL    DF AUTOMATED     DRUG SCREEN, URINE   Result Value Ref Range    AMPHETAMINES NEGATIVE  NEG      BARBITURATES NEGATIVE  NEG      BENZODIAZEPINES NEGATIVE  NEG      COCAINE NEGATIVE  NEG      METHADONE NEGATIVE  NEG      OPIATES NEGATIVE  NEG      PCP(PHENCYCLIDINE) NEGATIVE  NEG      THC (TH-CANNABINOL) NEGATIVE  NEG      Drug screen comment (NOTE)    URINALYSIS W/ RFLX MICROSCOPIC   Result Value Ref Range    Color YELLOW/STRAW      Appearance CLEAR CLEAR      Specific gravity 1.010 1.003 - 1.030      pH (UA) 6.0 5.0 - 8.0      Protein NEGATIVE  NEG mg/dL    Glucose NEGATIVE  NEG mg/dL    Ketone NEGATIVE  NEG mg/dL    Bilirubin NEGATIVE  NEG      Blood NEGATIVE  NEG      Urobilinogen 0.2 0.2 - 1.0 EU/dL    Nitrites NEGATIVE  NEG      Leukocyte Esterase NEGATIVE  NEG     TSH 3RD GENERATION   Result Value Ref Range    TSH 1.52 0.36 - 3.74 uIU/mL   LIPID PANEL   Result Value Ref Range    LIPID PROFILE          Cholesterol, total 93 <200 MG/DL    Triglyceride 150 (H) <150 MG/DL    HDL Cholesterol 40 MG/DL    LDL, calculated 23 0 - 100 MG/DL    VLDL, calculated 30 MG/DL    CHOL/HDL Ratio 2.3 0 - 5.0     GLUCOSE, FASTING   Result Value Ref Range    Glucose 97 65 - 100 MG/DL   POTASSIUM   Result Value Ref Range    Potassium 4.2 3.5 - 5.1 mmol/L   EKG, 12 LEAD, INITIAL   Result Value Ref Range    Ventricular Rate 67 BPM    Atrial Rate 67 BPM    P-R Interval 120 ms    QRS Duration 118 ms    Q-T Interval 440 ms    QTC Calculation (Bezet) 464 ms    Calculated P Axis 57 degrees    Calculated R Axis 71 degrees    Calculated T Axis 54 degrees    Diagnosis       Normal sinus rhythm with borderline short MD interval  Incomplete right bundle branch block  No previous ECGs available  Confirmed by Willette Skiff ([id number]) on 8/24/2018 8:23:29 AM         Immunizations administered during this encounter: There is no immunization history on file for this patient. Screening for Metabolic Disorders for Patients on Antipsychotic Medications  (Data obtained from the EMR)    Estimated Body Mass Index  Estimated body mass index is 22.46 kg/(m^2) as calculated from the following:    Height as of this encounter: 5' 8.11\" (1.73 m). Weight as of this encounter: 67.2 kg (148 lb 3.2 oz).      Vital Signs/Blood Pressure  Visit Vitals    BP 92/78    Pulse 71    Temp 97.4 °F (36.3 °C)    Resp 18    Ht 5' 8.11\" (1.73 m)    Wt 67.2 kg (148 lb 3.2 oz)    SpO2 98%    BMI 22.46 kg/m2       Blood Glucose/Hemoglobin A1c  Lab Results   Component Value Date/Time    Glucose 97 08/24/2018 04:40 AM    Glucose (POC) 111 (H) 07/27/2010 11:54 AM    Glucose (POC) 156 (H) 01/06/2010 05:36 PM       Lab Results   Component Value Date/Time    Hemoglobin A1c 5.1 01/07/2010 03:06 AM        Lipid Panel  Lab Results   Component Value Date/Time    Cholesterol, total 93 08/24/2018 04:40 AM    HDL Cholesterol 40 08/24/2018 04:40 AM    LDL, calculated 23 08/24/2018 04:40 AM    Triglyceride 150 (H) 08/24/2018 04:40 AM    CHOL/HDL Ratio 2.3 08/24/2018 04:40 AM        Discharge Diagnosis: Please refer to physician's discharge summary. Discharge Plan: Pt will be discharging today and transported by volunteer transportation for the [facility]. Pt is in a good mood, appropriate affect and appears to be motivated to attend inpatient substance abuse treatment. Pt was given resources for outpatient treatment after discharging from the [facility] as well and housing resource assistance. Discharge Medication List and Instructions:   Current Discharge Medication List      CONTINUE these medications which have CHANGED    Details   !! hydrOXYzine pamoate (VISTARIL) 50 mg capsule Take 1 Cap by mouth three (3) times daily as needed for Anxiety for up to 14 days. Indications: anxiety  Qty: 90 Cap, Refills: 0      !! mirtazapine (REMERON) 7.5 mg tablet Take 3 Tabs by mouth nightly. Indications: major depressive disorder  Qty: 90 Tab, Refills: 0       !! - Potential duplicate medications found. Please discuss with provider. CONTINUE these medications which have NOT CHANGED    Details   !! hydroxyzine pamoate (VISTARIL PO) Take  by mouth. !! mirtazapine (REMERON PO) Take  by mouth. !! - Potential duplicate medications found. Please discuss with provider. STOP taking these medications       olanzapine (ZYPREXA PO) Comments:   Reason for Stopping:               Unresulted Labs     None        To obtain results of studies pending at discharge, please contact N/A. Follow-up Information     Follow up With Details Comments [id number] Nicolasa Farooq Call today For outpatient susbstance services Intake appointment for services. Due to the limited slots, you may want to arrive by 7:00am. The doors open at 7:30am and you should sign in at the registration desk. [address]  Hours: Monday-Friday 8:30am- 12:00PM & 1:00PM-4:30pm   [phone] (phone)  [phone]  (fax)      [facility] [address] [facility] for mental health and susbtance abuse medication  management, therapy and case management serivces. [address]  [phone]      [facility] today This is the inpatient rehabilitation facility for substance abuse you will be going to today. [address]  [address] [place] [address],[address] Floor    [address] [facility], [address]  [phone]  Housing Crisis Intake Mara Lentz [phone]            Advanced Directive:   Does the patient have an appointed surrogate decision maker? Unknown   Does the patient have a Medical Advance Directive? Unknown   Does the patient have a Psychiatric Advance Directive? Unknown   If the patient does not have a surrogate or Medical Advance Directive AND Psychiatric Advance Directive, the patient was offered information on these advance directives. Unknown        Patient Instructions: Please continue all medications until otherwise directed by physician. Tobacco Cessation Discharge Plan:   Is the patient a smoker and needs referral for smoking cessation? No  Patient referred to the following for smoking cessation with an appointment?  No   Patient was offered medication to assist with smoking cessation at discharge? No  Was education for smoking cessation added to the discharge instructions? No     Alcohol/Substance Abuse Discharge Plan:   Does the patient have a history of substance/alcohol abuse and requires a referral for treatment? Yes  Patient referred to the following for substance/alcohol abuse treatment with an appointment? Yes  Patient was offered medication to assist with alcohol cessation at discharge? No  Was education for substance/alcohol abuse added to discharge instructions? Yes     Patient discharged to Home; provided to the patient/caregiver either in hard copy or electronically. Continuing care paperwork was faxed to community mental health providers.

## 2018-08-27 NOTE — BH NOTES
Pt was visited by the family. Compliant with all medications and meals. No behavioral problems noted. Focused on medications. Received Vistaril for anxiety as requested at HS.

## 2018-09-27 ENCOUNTER — OFFICE VISIT (OUTPATIENT)
Dept: PRIMARY CARE CLINIC | Age: 33
End: 2018-09-27
Payer: COMMERCIAL

## 2018-09-27 VITALS
HEIGHT: 72 IN | WEIGHT: 162 LBS | SYSTOLIC BLOOD PRESSURE: 120 MMHG | BODY MASS INDEX: 21.94 KG/M2 | OXYGEN SATURATION: 95 % | HEART RATE: 80 BPM | TEMPERATURE: 98.4 F | DIASTOLIC BLOOD PRESSURE: 80 MMHG

## 2018-09-27 DIAGNOSIS — R53.83 FATIGUE, UNSPECIFIED TYPE: ICD-10-CM

## 2018-09-27 DIAGNOSIS — J06.9 UPPER RESPIRATORY TRACT INFECTION, UNSPECIFIED TYPE: Primary | ICD-10-CM

## 2018-09-27 LAB
HCT VFR BLD CALC: 45.5 % (ref 40.5–52.5)
HEMOGLOBIN: 15.1 G/DL (ref 13.5–17.5)
INFLUENZA A ANTIBODY: NORMAL
INFLUENZA B ANTIBODY: NORMAL
MCH RBC QN AUTO: 28.7 PG (ref 26–34)
MCHC RBC AUTO-ENTMCNC: 33.1 G/DL (ref 31–36)
MCV RBC AUTO: 86.8 FL (ref 80–100)
PDW BLD-RTO: 13 % (ref 12.4–15.4)
PLATELET # BLD: 294 K/UL (ref 135–450)
PMV BLD AUTO: 9 FL (ref 5–10.5)
RBC # BLD: 5.24 M/UL (ref 4.2–5.9)
S PYO AG THROAT QL: NORMAL
WBC # BLD: 13.9 K/UL (ref 4–11)

## 2018-09-27 PROCEDURE — 87880 STREP A ASSAY W/OPTIC: CPT | Performed by: NURSE PRACTITIONER

## 2018-09-27 PROCEDURE — 87804 INFLUENZA ASSAY W/OPTIC: CPT | Performed by: NURSE PRACTITIONER

## 2018-09-27 PROCEDURE — 99213 OFFICE O/P EST LOW 20 MIN: CPT | Performed by: NURSE PRACTITIONER

## 2018-09-27 RX ORDER — AZITHROMYCIN 250 MG/1
TABLET, FILM COATED ORAL
Qty: 1 PACKET | Refills: 0 | Status: CANCELLED | OUTPATIENT
Start: 2018-09-27 | End: 2018-10-01

## 2018-09-27 RX ORDER — AZITHROMYCIN 250 MG/1
TABLET, FILM COATED ORAL
Qty: 1 PACKET | Refills: 0 | Status: SHIPPED | OUTPATIENT
Start: 2018-09-27 | End: 2018-10-01

## 2018-09-27 RX ORDER — IBUPROFEN 200 MG
200 TABLET ORAL EVERY 6 HOURS PRN
COMMUNITY

## 2018-09-27 ASSESSMENT — ENCOUNTER SYMPTOMS
EYE DISCHARGE: 0
NAUSEA: 0
EYE ITCHING: 0
CHOKING: 0
SINUS PAIN: 1
DIARRHEA: 0
RHINORRHEA: 1
APNEA: 0
SORE THROAT: 1
STRIDOR: 0
SINUS PRESSURE: 1
VOMITING: 0
ABDOMINAL PAIN: 0
CHEST TIGHTNESS: 1
WHEEZING: 0
VOICE CHANGE: 0
COUGH: 1
BACK PAIN: 0
EYE PAIN: 0
SHORTNESS OF BREATH: 0
EYE REDNESS: 0
COLOR CHANGE: 0
TROUBLE SWALLOWING: 0
PHOTOPHOBIA: 0

## 2018-09-27 ASSESSMENT — PATIENT HEALTH QUESTIONNAIRE - PHQ9
SUM OF ALL RESPONSES TO PHQ QUESTIONS 1-9: 0
SUM OF ALL RESPONSES TO PHQ9 QUESTIONS 1 & 2: 0
1. LITTLE INTEREST OR PLEASURE IN DOING THINGS: 0
2. FEELING DOWN, DEPRESSED OR HOPELESS: 0
SUM OF ALL RESPONSES TO PHQ QUESTIONS 1-9: 0

## 2018-09-27 NOTE — PROGRESS NOTES
Subjective:      Patient ID: Angelina Garrido is a 34 y.o. male. HPI  Patient presents with extreme fatigue and fevers since Monday. He has been experiencing dizziness, chills, diaphoresis, and headaches. He also has had a sore throat, congestion, runny nose, and productive cough with yellow sputum. He reports that many of his coworkers have been sick, but he doesn't know what they have been sick with. His highest fever was on Tuesday at 101 degrees; he has since been taking tylenol and advil for fever and pain control   He has been taking nyquil for cold-symptoms. He also says his right ear has bothered him some. He has been sleeping all day for the past two days before coming here. He denies shortness of breath and chest pain. Review of Systems   Constitutional: Positive for activity change, appetite change, chills, diaphoresis, fatigue and fever. Negative for unexpected weight change. HENT: Positive for congestion, ear pain, postnasal drip, rhinorrhea, sinus pain, sinus pressure and sore throat. Negative for dental problem, drooling, ear discharge, hearing loss, mouth sores, nosebleeds, sneezing, tinnitus, trouble swallowing and voice change. Eyes: Negative for photophobia, pain, discharge, redness, itching and visual disturbance. Respiratory: Positive for cough and chest tightness. Negative for apnea, choking, shortness of breath, wheezing and stridor. Cardiovascular: Negative for chest pain. Gastrointestinal: Negative for abdominal pain, diarrhea, nausea and vomiting. Endocrine: Negative for cold intolerance and heat intolerance. Genitourinary: Negative for difficulty urinating and dysuria. Musculoskeletal: Negative for arthralgias, back pain, gait problem, joint swelling, myalgias, neck pain and neck stiffness. Skin: Negative for color change and pallor. Allergic/Immunologic: Negative for environmental allergies.    Neurological: Positive for dizziness, weakness,

## 2018-09-27 NOTE — LETTER
[address] [facility] [address]  Phone: [phone]  Fax: [place] Zayas, CECILIA - AFTAB        September 27, 2018     Patient: Reese Loving   YOB: 1985   Date of Visit: 9/27/2018       To Whom it May Concern:    Lucero Dukes was seen in my clinic on 9/27/2018. He may return to work on 10/1/2018. Please excuse him from work from 9/25/2018 through 10/1/2018. If you have any questions or concerns, please don't hesitate to call.     Sincerely,         Hira Mosqueda, CECILIA - CNP

## 2018-09-28 LAB — MONO TEST: NEGATIVE

## 2019-04-01 NOTE — CONSULTS
Medical Consult for [facility] Patient    Consult H&P   dictated, see patient chart    Impression:    Bladimir Shelley a 29 y.o. male with past medical history of bipolar, schizophrenia, ventral septal defects and seizures presents with behavioral health problems of auditory hallucinations admitted for further psychiatric evaluation and treatment. Plan:   1. Psychiatry to manage mental health issues  2. Continuing home medications. 3. Medically stable at this time, will follow up as needed. 4. No VTE prophylaxis indicated or necessary at this time.      Thank you  Tabitha Briones MD  8/24/2018, 4:19 PM normal